# Patient Record
Sex: FEMALE | Race: WHITE | NOT HISPANIC OR LATINO | Employment: FULL TIME | URBAN - METROPOLITAN AREA
[De-identification: names, ages, dates, MRNs, and addresses within clinical notes are randomized per-mention and may not be internally consistent; named-entity substitution may affect disease eponyms.]

---

## 2017-01-02 DIAGNOSIS — M35.00 SJOGREN'S SYNDROME: ICD-10-CM

## 2017-01-02 RX ORDER — CEVIMELINE HYDROCHLORIDE 30 MG/1
CAPSULE ORAL
Qty: 180 CAPSULE | Refills: 0 | Status: SHIPPED | OUTPATIENT
Start: 2017-01-02 | End: 2017-09-23 | Stop reason: SDUPTHER

## 2017-01-26 DIAGNOSIS — M35.00 SJOGREN'S SYNDROME: ICD-10-CM

## 2017-01-26 RX ORDER — HYDROXYCHLOROQUINE SULFATE 200 MG/1
TABLET, FILM COATED ORAL
Qty: 180 TABLET | Refills: 0 | Status: SHIPPED | OUTPATIENT
Start: 2017-01-26 | End: 2017-05-04 | Stop reason: SDUPTHER

## 2017-04-07 NOTE — TELEPHONE ENCOUNTER
----- Message from Layne Eldridge sent at 4/7/2017  1:47 PM CDT -----  Contact: pt  Pt is requesting an appt due to being an established patient with same doctor at [facility]. First time seeing him at [facility].     Pt can be reached at [phone].

## 2017-04-10 RX ORDER — AMLODIPINE BESYLATE 2.5 MG/1
2.5 TABLET ORAL DAILY
Qty: 90 TABLET | Refills: 1 | Status: SHIPPED | OUTPATIENT
Start: 2017-04-10 | End: 2017-05-29 | Stop reason: SDUPTHER

## 2017-04-28 RX ORDER — LOSARTAN POTASSIUM 25 MG/1
25 TABLET ORAL NIGHTLY
Refills: 3 | COMMUNITY
Start: 2017-04-07 | End: 2017-05-29 | Stop reason: SDUPTHER

## 2017-04-28 RX ORDER — LANOLIN ALCOHOL/MO/W.PET/CERES
1 CREAM (GRAM) TOPICAL DAILY
COMMUNITY
End: 2022-07-28

## 2017-04-29 RX ORDER — ACETAMINOPHEN 160 MG/5ML
200 SUSPENSION, ORAL (FINAL DOSE FORM) ORAL DAILY
COMMUNITY

## 2017-05-04 DIAGNOSIS — M35.00 SJOGREN'S SYNDROME: ICD-10-CM

## 2017-05-04 RX ORDER — HYDROXYCHLOROQUINE SULFATE 200 MG/1
TABLET, FILM COATED ORAL
Qty: 180 TABLET | Refills: 0 | Status: SHIPPED | OUTPATIENT
Start: 2017-05-04 | End: 2017-07-30 | Stop reason: SDUPTHER

## 2017-05-28 PROBLEM — R80.9 PROTEINURIA: Status: ACTIVE | Noted: 2017-05-28

## 2017-05-28 PROBLEM — I10 ESSENTIAL HYPERTENSION: Status: ACTIVE | Noted: 2017-05-28

## 2017-05-28 PROBLEM — I08.0 MITRAL AND AORTIC REGURGITATION: Status: ACTIVE | Noted: 2017-05-28

## 2017-05-29 ENCOUNTER — OFFICE VISIT (OUTPATIENT)
Dept: CARDIOLOGY | Facility: CLINIC | Age: 52
End: 2017-05-29
Payer: COMMERCIAL

## 2017-05-29 VITALS
HEART RATE: 62 BPM | SYSTOLIC BLOOD PRESSURE: 131 MMHG | DIASTOLIC BLOOD PRESSURE: 75 MMHG | HEIGHT: 64 IN | BODY MASS INDEX: 26.08 KG/M2 | WEIGHT: 152.75 LBS

## 2017-05-29 DIAGNOSIS — I08.0 MITRAL AND AORTIC REGURGITATION: ICD-10-CM

## 2017-05-29 DIAGNOSIS — I73.00 RAYNAUD'S PHENOMENON WITHOUT GANGRENE: ICD-10-CM

## 2017-05-29 DIAGNOSIS — I10 ESSENTIAL HYPERTENSION: Primary | ICD-10-CM

## 2017-05-29 PROCEDURE — 99213 OFFICE O/P EST LOW 20 MIN: CPT | Mod: S$GLB,,, | Performed by: INTERNAL MEDICINE

## 2017-05-29 PROCEDURE — 99999 PR PBB SHADOW E&M-EST. PATIENT-LVL III: CPT | Mod: PBBFAC,,, | Performed by: INTERNAL MEDICINE

## 2017-05-29 RX ORDER — LOSARTAN POTASSIUM 25 MG/1
25 TABLET ORAL NIGHTLY
Qty: 90 TABLET | Refills: 1 | Status: SHIPPED | OUTPATIENT
Start: 2017-05-29 | End: 2017-07-11 | Stop reason: SDUPTHER

## 2017-05-29 RX ORDER — ESTRADIOL 10 UG/1
TABLET VAGINAL
Refills: 6 | COMMUNITY
Start: 2017-05-10 | End: 2018-09-14 | Stop reason: ALTCHOICE

## 2017-05-29 RX ORDER — AMLODIPINE BESYLATE 2.5 MG/1
2.5 TABLET ORAL DAILY
Qty: 90 TABLET | Refills: 1 | Status: SHIPPED | OUTPATIENT
Start: 2017-05-29 | End: 2018-01-15 | Stop reason: SDUPTHER

## 2017-05-29 NOTE — PROGRESS NOTES
Subjective:    Patient ID:  Shanta Sterling is a 51 y.o. female who presents for Hypertension and Valvular Heart Disease      HPI  [facility] RECORDS REVIEWED, NO LABS, DOING OK, BP LOG OK, RAYNAUD'S MUCH BETTER WITH CCB, SEE ROS  Past Medical History:   Diagnosis Date    Acid reflux     Acute gastritis     Dry eyes     Dry mouth     Heart murmur     Hypertension     Pneumonia 2000    [facility]    Sicca syndrome, unspecified     Sjogren's disease     Valvular regurgitation      Past Surgical History:   Procedure Laterality Date    APPENDECTOMY  11/2009         BREAST SURGERY Bilateral 04/2004    Augmentation    FOOT NEUROMA SURGERY Left 1992    HYSTERECTOMY  2002     Family History   Problem Relation Age of Onset    Hypertension Mother     COPD Father     Diabetes Mellitus Father     Heart disease Father     Hypertension Father      Social History     Social History    Marital status:      Spouse name: N/A    Number of children: N/A    Years of education: N/A     Social History Main Topics    Smoking status: Never Smoker    Smokeless tobacco: Never Used    Alcohol use Yes      Comment: one drink every deb 6 days/week    Drug use: No    Sexual activity: Not Asked     Other Topics Concern    None     Social History Narrative    None       Review of patient's allergies indicates:   Allergen Reactions    Codeine Other (See Comments)     vomiting       Current Outpatient Prescriptions:     amlodipine (NORVASC) 2.5 MG tablet, Take 1 tablet (2.5 mg total) by mouth once daily. (Patient taking differently: Take 1.25 mg by mouth once daily. ), Disp: 90 tablet, Rfl: 1    calcium citrate-vitamin D3 315-200 mg (CITRACAL+D) 315-200 mg-unit per tablet, Take 1 tablet by mouth once daily., Disp: , Rfl:     carboxymethylcellulose sodium (THERA TEARS) 0.25 % Drop, 1 drop as needed., Disp: , Rfl:     cevimeline (EVOXAC) 30 mg capsule, TAKE ONE CAPSULE BY MOUTH TWICE DAILY, Disp: 180  capsule, Rfl: 0    coenzyme Q10 (CO Q-10) 200 mg capsule, Take 200 mg by mouth once daily., Disp: , Rfl:     hydroxychloroquine (PLAQUENIL) 200 mg tablet, TAKE ONE TABLET BY MOUTH TWICE DAILY, Disp: 180 tablet, Rfl: 0    losartan (COZAAR) 25 MG tablet, Take 25 mg by mouth every evening., Disp: , Rfl: 3    multivitamin (MULTIVITAMIN) per tablet, Take 1 tablet by mouth once daily., Disp: , Rfl:     PROAIR RESPICLICK 90 mcg/actuation AePB, INHALE 2 PUFFS PO 30 MINUTES BEFORE EXERCISE, Disp: , Rfl: 5    ranitidine (ZANTAC) 150 MG tablet, Take 150 mg by mouth nightly., Disp: , Rfl:     YUVAFEM 10 mcg Tab, I ONE T VAG  HS 2 TIMES PER WEEK UTD, Disp: , Rfl: 6    fish oil-omega-3 fatty acids 300-1,000 mg capsule, Take 1 capsule by mouth 2 (two) times daily. , Disp: , Rfl:     Review of Systems   Constitution: Negative for chills, diaphoresis, fever, weakness, malaise/fatigue and night sweats.   HENT: Negative for congestion, hearing loss and nosebleeds.    Eyes: Negative for blurred vision, discharge, double vision and visual disturbance.   Cardiovascular: Negative for claudication, cyanosis, dyspnea on exertion, irregular heartbeat, leg swelling, near-syncope, orthopnea, palpitations, paroxysmal nocturnal dyspnea and syncope. Chest pain: OCC , WITH A LOT OF EXERTION.   Respiratory: Negative for cough, hemoptysis, shortness of breath, sleep disturbances due to breathing, sputum production and wheezing.    Endocrine: Negative for cold intolerance, heat intolerance and polyuria.   Hematologic/Lymphatic: Negative for adenopathy. Does not bruise/bleed easily.   Skin: Negative for color change, itching and nail changes.   Musculoskeletal: Negative for back pain, falls, joint pain and stiffness.   Gastrointestinal: Negative for bloating, abdominal pain, change in bowel habit, constipation, dysphagia, flatus, heartburn, hematemesis, jaundice, melena and vomiting.   Genitourinary: Negative for dysuria, flank pain, frequency  "and incomplete emptying.   Neurological: Negative for brief paralysis, difficulty with concentration, disturbances in coordination, dizziness, focal weakness, light-headedness, loss of balance, numbness, paresthesias, seizures, sensory change and tremors.   Psychiatric/Behavioral: Negative for altered mental status, depression, memory loss and substance abuse. The patient is not nervous/anxious.    Allergic/Immunologic: Negative for persistent infections.        Objective:      Vitals:    05/29/17 1527   BP: 131/75   Pulse: 62   Weight: 69.3 kg (152 lb 12.5 oz)   Height: 5' 4" (1.626 m)   PainSc: 0-No pain     Body mass index is 26.22 kg/m².    Physical Exam   Constitutional: She is oriented to person, place, and time. She appears well-developed and well-nourished. She is active.   HENT:   Head: Normocephalic and atraumatic.   Mouth/Throat: Oropharynx is clear and moist and mucous membranes are normal.   Eyes: Conjunctivae and EOM are normal. Pupils are equal, round, and reactive to light.   Neck: Trachea normal and normal range of motion. Neck supple. Normal carotid pulses, no hepatojugular reflux and no JVD present. Carotid bruit is not present. No tracheal deviation, no edema and no erythema present. No thyromegaly present.   Cardiovascular: Normal rate and regular rhythm.   No extrasystoles are present. PMI is not displaced.  Exam reveals no gallop and no friction rub.    Murmur heard.  High-pitched blowing holosystolic murmur is present  at the apex  High-pitched blowing decrescendo early diastolic murmur is present  at the upper right sternal border radiating to the apex  Pulses:       Carotid pulses are 2+ on the right side, and 2+ on the left side.       Radial pulses are 2+ on the right side, and 2+ on the left side.        Femoral pulses are 2+ on the right side, and 2+ on the left side.       Popliteal pulses are 2+ on the right side, and 2+ on the left side.        Dorsalis pedis pulses are 2+ on the " right side, and 2+ on the left side.        Posterior tibial pulses are 2+ on the right side, and 2+ on the left side.   Pulmonary/Chest: Effort normal and breath sounds normal. No tachypnea and no bradypnea. She has no wheezes. She has no rales. She exhibits no tenderness.   Abdominal: Soft. Bowel sounds are normal. She exhibits no distension and no mass. There is no hepatosplenomegaly. There is no tenderness. There is no guarding and no CVA tenderness.   Musculoskeletal: Normal range of motion. She exhibits no edema or tenderness.   Lymphadenopathy:     She has no cervical adenopathy.   Neurological: She is alert and oriented to person, place, and time. She has normal strength. She displays no tremor. No cranial nerve deficit. She exhibits normal muscle tone. Coordination normal.   Skin: Skin is warm and dry. No rash noted. No cyanosis or erythema. No pallor.   Psychiatric: She has a normal mood and affect. Her speech is normal and behavior is normal. Judgment and thought content normal.               ..    Chemistry        Component Value Date/Time     10/27/2015 0921    K 4.1 10/27/2015 0921     10/27/2015 0921    CO2 26 10/27/2015 0921    BUN 14 10/27/2015 0921    CREATININE 1.0 10/27/2015 0921    GLU 88 10/27/2015 0921        Component Value Date/Time    CALCIUM 8.8 10/27/2015 0921    ALKPHOS 55 10/27/2015 0921    AST 23 10/27/2015 0921    ALT 19 10/27/2015 0921    BILITOT 0.7 10/27/2015 0921            ..No results found for: CHOL  No results found for: HDL  No results found for: LDLCALC  No results found for: TRIG  No results found for: CHOLHDL  ..  Lab Results   Component Value Date    WBC 5.97 10/27/2015    HGB 12.9 10/27/2015    HCT 38.4 10/27/2015    MCV 90 10/27/2015     10/27/2015       Test(s) Reviewed  I have reviewed the following in detail:  [] Stress test   [] Angiography   [] Echocardiogram   [] Labs   [x] Other:       Assessment:         ICD-10-CM ICD-9-CM   1. Essential  hypertension I10 401.9   2. Mitral and aortic regurgitation I08.0 396.3   3. Raynaud's phenomenon without gangrene I73.00 443.0     Problem List Items Addressed This Visit        Cardiology Problems    Essential hypertension - Primary    Relevant Orders    Basic metabolic panel    Mitral and aortic regurgitation       Other    Raynaud's phenomenon      Other Visit Diagnoses    None.          Plan:         ALL CV CLINICALLY STABLE, NO ANGINA, NO HF, NO TIA, NO CLINICAL ARRHYTHMIA,CONTINUE CURRENT MEDS, EDUCATION, DIET, EXERCISE, CHECK BMP OK,,.RTC IN 6 MO WITH BP LOG  Essential hypertension  -     Basic metabolic panel; Future; Expected date: 05/29/2017    Mitral and aortic regurgitation    Raynaud's phenomenon without gangrene    RTC Low level/low impact aerobic exercise 5x's/wk. Heart healthy diet and risk factor modification.    See labs and med orders.    Aerobic exercise 5x's/wk. Heart healthy diet and risk factor modification.    See labs and med orders.

## 2017-05-29 NOTE — PATIENT INSTRUCTIONS
Exercise for a Healthier Heart  You may wonder how you can improve the health of your heart. If youre thinking about exercise, youre on the right track. You dont need to become an athlete, but you do need a certain amount of brisk exercise to help strengthen your heart. If you have been diagnosed with a heart condition, your doctor may recommend exercise to help stabilize your condition. To help make exercise a habit, choose safe, fun activities.     Exercise with a friend. When activity is fun, you're more likely to stick with it.     Be sure to check with your healthcare provider before starting an exercise program.   Why exercise?  Exercising regularly offers many healthy rewards. It can help you do all of the following:  · Improve your blood cholesterol level to help prevent further heart trouble  · Lower your blood pressure to help prevent a stroke or heart attack  · Control diabetes, or reduce your risk of getting this disease  · Improve your heart and lung function  · Reach and maintain a healthy weight  · Make your muscles stronger and more limber so you can stay active  · Prevent falls and fractures by slowing the loss of bone mass (osteoporosis)  · Manage stress better  · Reduce your blood pressure  · Improve your sense of self and your body image  Exercise tips  Ease into your routine. Set small goals. Then build on them.  Exercise on most days. Aim for a total of 150 or more minutes of moderate to  vigorous intensity activity each week. Consider 40 minutes, 3 to 4 times a week. For best results, activity should last for 40 minutes on average. It is OK to work up to the 40 minute period over time. Examples of moderate-intensity activity is walking 1 mile in 15 minutes or 30 to 45 minutes of yard work.  Step up your daily activity level. Along with your exercise program, try being more active throughout the day. Walk instead of drive. Do more household tasks or yard work.  Choose one or more  activities you enjoy. Walking is one of the easiest things you can do. You can also try swimming, riding a bike, dancing, or taking an exercise class.  Stop exercising and call your doctor if you:  · Have chest pain or feel dizzy or lightheaded  · Feel burning, tightness, pressure, or heaviness in your chest, neck, shoulders, back, or arms  · Have unusual shortness of breath  · Have increased joint or muscle pain  · Have palpitations or an irregular heartbeat   Date Last Reviewed: 5/1/2016 © 2000-2016 [employer]. [address] [place] [id number]. All rights reserved. This information is not intended as a substitute for professional medical care. Always follow your healthcare professional's instructions.        Controlling High Blood Pressure  High blood pressure (hypertension) is often called the silent killer. This is because many people who have it dont know it. High blood pressure is defined as 140/90 mm Hg or higher. Know your blood pressure and remember to check it regularly. Doing so can save your life. Here are some things you can do to help control your blood pressure.    Choose heart-healthy foods  · Select low-salt, low-fat foods. Limit sodium intake to 2,400 mg per day or the amount suggested by your healthcare provider.  · Limit canned, dried, cured, packaged, and fast foods. These can contain a lot of salt.  · Eat 8 to 10 servings of fruits and vegetables every day.  · Choose lean meats, fish, or chicken.  · Eat whole-grain pasta, brown rice, and beans.  · Eat 2 to 3 servings of low-fat or fat-free dairy products.  · Ask your doctor about the DASH eating plan. This plan helps reduce blood pressure.  · When you go to a restaurant, ask that your meal be prepared with no added salt.  Maintain a healthy weight  · Ask your healthcare provider how many calories to eat a day. Then stick to that number.  · Ask your healthcare provider what weight range is healthiest for you. If you are  overweight, a weight loss of only 3% to 5% of your body weight can help lower blood pressure. Generally, a good weight loss goal is to lose 10% of your body weight in a year.  · Limit snacks and sweets.  · Get regular exercise.  Get up and get active  · Choose activities you enjoy. Find ones you can do with friends or family. This includes bicycling, dancing, walking, and jogging.  · Park farther away from building entrances.  · Use stairs instead of the elevator.  · When you can, walk or bike instead of driving.  · Campbell leaves, garden, or do household repairs.  · Be active at a moderate to vigorous level of physical activity for at least 40 minutes for a minimum of 3 to 4 days a week.   Manage stress  · Make time to relax and enjoy life. Find time to laugh.  · Communicate your concerns with your loved ones and your healthcare provider.  · Visit with family and friends, and keep up with hobbies.  Limit alcohol and quit smoking  · Men should have no more than 2 drinks per day.  · Women should have no more than 1 drink per day.  · Talk with your healthcare provider about quitting smoking. Smoking significantly increases your risk for heart disease and stroke. Ask your healthcare provider about community smoking cessation programs and other options.  Medicines  If lifestyle changes arent enough, your healthcare provider may prescribe high blood pressure medicine. Take all medicines as prescribed. If you have any questions about your medicines, ask your healthcare provider before stopping or changing them.   Date Last Reviewed: 4/27/2016  © 5968-5517 [employer]. [address] [place] [address], [place] [address]. All rights reserved. This information is not intended as a substitute for professional medical care. Always follow your healthcare professional's instructions.        Heart Valve Problems  Your hearts job is to pump blood through your body. That job starts with pumping blood through the heart itself.  Inside your heart, blood passes through a series of one-way lam called valves. If a valve works poorly, not enough blood moves forward. A problem heart valve may not open wide enough, not close tightly enough, or both. In any case, not enough blood is sent to the heart muscle or out to the body.  Symptoms of heart valve problems  You can have a problem valve for decades yet have no symptoms. If you do have symptoms, they may come on so slowly that you barely notice them. In other cases, though, symptoms appear suddenly. You might have one or more of these symptoms:  · Problems breathing when you lie down, exert yourself, or get stressed emotionally  · Pain, pressure, tightness, or numbness in your chest, neck, back, or arms (angina)  · Feeling dizzy, faint, or lightheaded  · Tiredness, especially with activity or as the day goes on  · Waking up at night coughing or short of breath  · A fast, pounding, or irregular heartbeat  · A fluttering feeling in your chest  · Swollen ankles or feet  · Fainting, especially upon standing up or with exertion  Problems opening (stenosis)    When a valve doesnt open all the way, the problem is called stenosis. The leaflets may be stuck together or too stiff to open fully. When the valve doesnt open fully, blood has to flow through a smaller opening. So the heart muscle has to work harder to push the blood through the valve.  Problems closing (regurgitation)    When a valve doesnt close tightly enough and blood leaks backward through the valve, the problem is called regurgitation or insufficiency. The valve itself may be described as leaky. Leaflets may fit together poorly. Or the structures that support them may be torn. Some blood leaks through the valve back into the chamber it just left. So the heart has to move that blood twice. This can result in heart muscle damage.  Common causes of valve problems  People of any age can have heart valve trouble. You may have been born  with a problem valve. Or a valve may have worn out as youve aged. It may not be possible to pinpoint what caused your valve problem. But common causes include:  · Buildup of calcium or scar tissue on a valve  · Rheumatic fever and certain other infections and diseases  · High blood pressure  · Other heart problems, such as coronary artery disease  · Congenital defects of the heart valves      Date Last Reviewed: 6/1/2016  © 2085-2305 [employer]. [address] [place] [address], [place] [address]. All rights reserved. This information is not intended as a substitute for professional medical care. Always follow your healthcare professional's instructions.

## 2017-07-06 ENCOUNTER — OFFICE VISIT (OUTPATIENT)
Dept: RHEUMATOLOGY | Facility: CLINIC | Age: 52
End: 2017-07-06
Payer: COMMERCIAL

## 2017-07-06 VITALS
SYSTOLIC BLOOD PRESSURE: 128 MMHG | DIASTOLIC BLOOD PRESSURE: 90 MMHG | HEART RATE: 57 BPM | HEIGHT: 64 IN | WEIGHT: 158 LBS | BODY MASS INDEX: 26.98 KG/M2

## 2017-07-06 DIAGNOSIS — I73.00 RAYNAUD'S PHENOMENON WITHOUT GANGRENE: ICD-10-CM

## 2017-07-06 DIAGNOSIS — M35.00 SJOGREN'S SYNDROME, WITH UNSPECIFIED ORGAN INVOLVEMENT: Primary | ICD-10-CM

## 2017-07-06 DIAGNOSIS — Z79.899 LONG-TERM USE OF HYDROXYCHLOROQUINE: ICD-10-CM

## 2017-07-06 DIAGNOSIS — I10 ESSENTIAL HYPERTENSION: ICD-10-CM

## 2017-07-06 PROCEDURE — 99214 OFFICE O/P EST MOD 30 MIN: CPT | Mod: S$GLB,,, | Performed by: INTERNAL MEDICINE

## 2017-07-06 PROCEDURE — 99999 PR PBB SHADOW E&M-EST. PATIENT-LVL III: CPT | Mod: PBBFAC,,, | Performed by: INTERNAL MEDICINE

## 2017-07-06 NOTE — PROGRESS NOTES
"Subjective:       Patient ID: Shanta Sterling is a 51 y.o. female with Sjogren's syndrome.    Chief Complaint: Follow-up  She returns for follow up. 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      She is taking HCQ and evoxac and is medically doing well with theratears vitamins & eye drops. She could not tolerate restasis. Scheduled for eye exam. Sees the dentist 2 x/year. Teeth are in good repair.Raynaud's has been lessened by the addition of a small amlodipine by her cardiologist. She has no ischemic changes, no digital scars, loss of pulp etc. Has some days where ankles and knees are stiff on arising. Denies dysphagia, tight skin, alopecia, pleurisy, pericarditis, photosensitivity, skin rashes. Has dry lips as well as mouth and eyes. May have reactive airway disease. Still taking CoQ 100 mg which has helped myalgias (in past) considerably.  She is very active and runs 3 x/week.     She is followed by cardiology where routine labs are done and reported are normal although there is no record of them. Cardiology has also documented heart murmurs and dx MR and AR: " High-pitched blowing holosystolic murmur is present  at the apex  High-pitched blowing decrescendo early diastolic murmur is present  at the upper right sternal border radiating to the apex"               She reports no joint swelling. Pertinent negatives include no dysuria, fatigue, fever, trouble swallowing, myalgias or headaches.         Current Outpatient Prescriptions   Medication Sig Dispense Refill    amlodipine (NORVASC) 2.5 MG tablet Take 1 tablet (2.5 mg total) by mouth once daily. 90 tablet 1    calcium citrate-vitamin D3 315-200 mg (CITRACAL+D) 315-200 mg-unit per tablet Take 1 tablet by mouth once daily.      carboxymethylcellulose sodium (THERA TEARS) 0.25 % Drop 1 drop as needed.      cevimeline (EVOXAC) 30 mg capsule TAKE ONE CAPSULE BY MOUTH TWICE DAILY 180 capsule 0 "    coenzyme Q10 (CO Q-10) 200 mg capsule Take 200 mg by mouth once daily.      hydroxychloroquine (PLAQUENIL) 200 mg tablet TAKE ONE TABLET BY MOUTH TWICE DAILY 180 tablet 0    losartan (COZAAR) 25 MG tablet Take 1 tablet (25 mg total) by mouth every evening. 90 tablet 1    multivitamin (MULTIVITAMIN) per tablet Take 1 tablet by mouth once daily.      PROAIR RESPICLICK 90 mcg/actuation AePB INHALE 2 PUFFS PO 30 MINUTES BEFORE EXERCISE  5    ranitidine (ZANTAC) 150 MG tablet Take 150 mg by mouth nightly.      YUVAFEM 10 mcg Tab I ONE T VAG  HS 2 TIMES PER WEEK UTD  6     No current facility-administered medications for this visit.        Allergies   Allergen Reactions    Codeine Other (See Comments)     vomiting     Past Medical History:   Diagnosis Date    Acid reflux     Acute gastritis     Dry eyes     Dry mouth     Heart murmur     Hypertension     [facility]    Sicca syndrome, unspecified     Sjogren's disease     Valvular regurgitation      Past Surgical History:   Procedure Laterality Date    APPENDECTOMY  11/2009         BREAST SURGERY Bilateral 04/2004    Augmentation    FOOT NEUROMA SURGERY Left 1992    HYSTERECTOMY  2002         Review of Systems   Constitutional: Positive for diaphoresis. Negative for fatigue, fever and unexpected weight change.   HENT: Negative.  Negative for mouth sores, sore throat, tinnitus and trouble swallowing.         Dry mouth   Eyes: Positive for redness. Negative for visual disturbance.        Dry eyes   Respiratory: Negative.  Negative for cough, choking, chest tightness and shortness of breath.    Cardiovascular: Negative.  Negative for chest pain, palpitations and leg swelling.   Gastrointestinal: Negative.  Negative for abdominal distention, abdominal pain, blood in stool, constipation, diarrhea, nausea and vomiting.        Heartburn   Endocrine: Negative.    Genitourinary: Negative.  Negative for dysuria, frequency, genital  "sores, hematuria and menstrual problem.   Musculoskeletal: Negative.  Negative for back pain, joint swelling, myalgias, neck pain and neck stiffness.   Skin: Negative.  Negative for rash.   Allergic/Immunologic: Negative.    Neurological: Positive for numbness. Negative for dizziness, syncope, weakness, light-headedness and headaches.   Hematological: Negative.  Negative for adenopathy. Does not bruise/bleed easily.   Psychiatric/Behavioral: Negative.  Negative for dysphoric mood and sleep disturbance. The patient is not nervous/anxious.          Family History   Problem Relation Age of Onset    Hypertension Mother     COPD Father     Diabetes Mellitus Father     Heart disease Father     Hypertension Father        SH: non smoker; social drinker; [employer] [employer] where she worked closed and she & others had to look for jobs. She is currently at [employer] [facility] [employer]. She lives on the [facility] and this is inconvenient and she has to do night shifts;    Active at work; runs 3 x/week    Objective:     BP (!) 128/90 (BP Location: Left arm, Patient Position: Sitting, BP Method: Automatic)   Pulse (!) 57   Ht 5' 4" (1.626 m)   Wt 71.7 kg (158 lb)   BMI 27.12 kg/m² Accompanied by .   Physical Exam   Vitals reviewed.  Constitutional: She is oriented to person, place, and time and well-developed, well-nourished, and in no distress. No distress.   HENT:   Head: Normocephalic and atraumatic.   Mouth/Throat: Oropharynx is clear and moist. No oropharyngeal exudate.   No facial rashes  Parotids not enlarged  No oral ulcers  Adequate moisture  Teeth in excellent repair.   Eyes: Conjunctivae and EOM are normal. Pupils are equal, round, and reactive to light. Right eye exhibits no discharge. Left eye exhibits no discharge. No scleral icterus.   Neck: Neck supple. No JVD present. No tracheal deviation present. No thyromegaly present.   Cardiovascular: Normal rate, regular rhythm, normal heart sounds and intact distal " "pulses.  Exam reveals no gallop and no friction rub.    No murmur heard.  Murmurs reported by cardiologist: "High-pitched blowing holosystolic murmur is present  at the apex  High-pitched blowing decrescendo early diastolic murmur is present  at the upper right sternal border radiating to the apex"   Pulmonary/Chest: Effort normal and breath sounds normal. No respiratory distress. She has no wheezes. She has no rales. She exhibits no tenderness.   Abdominal: Soft. Bowel sounds are normal. She exhibits no distension and no mass. There is no splenomegaly or hepatomegaly. There is no tenderness. There is no rebound and no guarding.   Lymphadenopathy:     She has no cervical adenopathy.        Right: No inguinal adenopathy present.        Left: No inguinal adenopathy present.   Neurological: She is alert and oriented to person, place, and time. She has normal reflexes. No cranial nerve deficit. Gait normal.   Proximal and distal muscle strength 5/5.   Skin: Skin is warm and dry. No rash noted. She is not diaphoretic.     Psychiatric: Mood, memory, affect and judgment normal.   Musculoskeletal: Normal range of motion. She exhibits no edema or tenderness.   Cspine FROM no tenderness  Tspine FROM no tenderness  Lspine FROM no tenderness.  TMJ: unremarkable  Shoulders: FROM; no synovitis;  Elbows: FROM; no synovitis; no tophi or nodules  Wrists: FROM; no synovitis;   MCPs: Minimal squaring of 1st C_MCP joints bilaterally. FROM; no synovitis; no metacarpalgia;  ok;  PIPs:FROM; no synovitis;   DIPs: FROM; no synovitis;   HIPS: FROM  Knees: FROM; no synovitis; no instability;  Ankles: FROM: no synovitis   Toes: ok; no metatarsalgia.         LABS:   10/27/15: ESR 3: CRP 2.2; CBC, CMP ok;   14: ESR 2; CRP 0.7; CBC ok; alb. 3.4;   13: ESR 10; CRP 0.7; CBC ok cnne 1.1; GFR 53;   MARIA DOLORES 1:160sp; +SSA; +SSB; +RF (53);   Neg or wnl: CCP, C3, C4, Scl-70; UA; LAC; aCLA; [id number]  Vit D 56   Imagin13: Arthritis " survey personally reviewed: unremarkable     Assessment:   Sjogren's syndrome since 7/11 on HCQ since 7/11--doing very well.   On  mg/d & evoxac 30 mg bid   +MARIA DOLORES, +SSA, +SSB, +RF    with dry eyes (intolerant to restasis),    dry mouth (on evoxac, intolerant to pilocarpine)    associated with polyarthritis (pain & swelling: hands, elbows, shoulders, knees & feet)    Occasional oral ulcers & upper dysphagia.    Raynaud's  & AM stiffness     RP helped by amlodipine 2.5 mg/d   mother with UCTD     Hypertension      Menopausal   S/P hysterectomy wo BSO   + vasomotor sxs-night sweats    Mitral and Aortic Insufficiency as per cardiology      Renal insufficiency precipitated by Aleve in past.    Normal weight.      Plan:   Continue  mg/day + evoxac 30 mg tid.  Keep up with eye appointments.   Keep up with exercise.  Fax any labs.   RTC 6 months or prn.  Answers for HPI/ROS submitted by the patient on 7/6/2017   swollen glands: No  heartburn: No  tingling: Yes

## 2017-07-11 RX ORDER — LOSARTAN POTASSIUM 25 MG/1
25 TABLET ORAL NIGHTLY
Qty: 30 TABLET | Refills: 5 | Status: SHIPPED | OUTPATIENT
Start: 2017-07-11 | End: 2017-12-19 | Stop reason: SDUPTHER

## 2017-07-30 DIAGNOSIS — M35.00 SJOGREN'S SYNDROME: ICD-10-CM

## 2017-07-30 RX ORDER — HYDROXYCHLOROQUINE SULFATE 200 MG/1
TABLET, FILM COATED ORAL
Qty: 180 TABLET | Refills: 0 | Status: SHIPPED | OUTPATIENT
Start: 2017-07-30 | End: 2017-11-01 | Stop reason: SDUPTHER

## 2017-09-23 DIAGNOSIS — M35.00 SJOGREN'S SYNDROME: ICD-10-CM

## 2017-09-24 RX ORDER — CEVIMELINE HYDROCHLORIDE 30 MG/1
CAPSULE ORAL
Qty: 180 CAPSULE | Refills: 2 | Status: SHIPPED | OUTPATIENT
Start: 2017-09-24 | End: 2018-09-15 | Stop reason: SDUPTHER

## 2017-10-02 RX ORDER — AMLODIPINE BESYLATE 2.5 MG/1
TABLET ORAL
Qty: 90 TABLET | Refills: 0 | Status: SHIPPED | OUTPATIENT
Start: 2017-10-02 | End: 2018-01-15

## 2017-10-02 RX ORDER — AMLODIPINE BESYLATE 2.5 MG/1
TABLET ORAL
Qty: 90 TABLET | Refills: 0 | Status: SHIPPED | OUTPATIENT
Start: 2017-10-02 | End: 2018-01-04 | Stop reason: SDUPTHER

## 2017-11-01 DIAGNOSIS — M35.00 SJOGREN'S SYNDROME: ICD-10-CM

## 2017-11-01 RX ORDER — HYDROXYCHLOROQUINE SULFATE 200 MG/1
TABLET, FILM COATED ORAL
Qty: 180 TABLET | Refills: 2 | Status: SHIPPED | OUTPATIENT
Start: 2017-11-01 | End: 2018-07-23 | Stop reason: SDUPTHER

## 2017-12-19 RX ORDER — LOSARTAN POTASSIUM 25 MG/1
TABLET ORAL
Qty: 30 TABLET | Refills: 0 | Status: SHIPPED | OUTPATIENT
Start: 2017-12-19 | End: 2018-01-15 | Stop reason: SDUPTHER

## 2018-01-05 RX ORDER — AMLODIPINE BESYLATE 2.5 MG/1
TABLET ORAL
Qty: 90 TABLET | Refills: 0 | Status: SHIPPED | OUTPATIENT
Start: 2018-01-05 | End: 2018-01-15

## 2018-01-08 NOTE — TELEPHONE ENCOUNTER
----- Message from Nilsa Morrow sent at 1/8/2018 10:09 AM CST -----  Contact: self   Patient is needing her labs put in the system so she can schedule to get them before her appt   Please call back [phone]

## 2018-01-11 ENCOUNTER — LAB VISIT (OUTPATIENT)
Dept: LAB | Facility: HOSPITAL | Age: 53
End: 2018-01-11
Attending: INTERNAL MEDICINE
Payer: COMMERCIAL

## 2018-01-11 DIAGNOSIS — I10 ESSENTIAL HYPERTENSION: ICD-10-CM

## 2018-01-11 DIAGNOSIS — I10 ESSENTIAL HYPERTENSION: Primary | ICD-10-CM

## 2018-01-11 LAB
ANION GAP SERPL CALC-SCNC: 9 MMOL/L
BUN SERPL-MCNC: 21 MG/DL
CALCIUM SERPL-MCNC: 9.9 MG/DL
CHLORIDE SERPL-SCNC: 106 MMOL/L
CO2 SERPL-SCNC: 23 MMOL/L
CREAT SERPL-MCNC: 1.1 MG/DL
EST. GFR  (AFRICAN AMERICAN): >60 ML/MIN/1.73 M^2
EST. GFR  (NON AFRICAN AMERICAN): 57.5 ML/MIN/1.73 M^2
GLUCOSE SERPL-MCNC: 87 MG/DL
POTASSIUM SERPL-SCNC: 4.8 MMOL/L
SODIUM SERPL-SCNC: 138 MMOL/L

## 2018-01-11 PROCEDURE — 36415 COLL VENOUS BLD VENIPUNCTURE: CPT | Mod: PO

## 2018-01-11 PROCEDURE — 80048 BASIC METABOLIC PNL TOTAL CA: CPT

## 2018-01-12 ENCOUNTER — LAB VISIT (OUTPATIENT)
Dept: LAB | Facility: HOSPITAL | Age: 53
End: 2018-01-12
Attending: OBSTETRICS & GYNECOLOGY
Payer: COMMERCIAL

## 2018-01-12 DIAGNOSIS — E66.8 OBESITY OF ENDOCRINE ORIGIN: ICD-10-CM

## 2018-01-12 DIAGNOSIS — F43.9 STATE OF STRESS: ICD-10-CM

## 2018-01-12 DIAGNOSIS — N95.1 SYMPTOMATIC MENOPAUSAL OR FEMALE CLIMACTERIC STATES: Primary | ICD-10-CM

## 2018-01-12 DIAGNOSIS — N94.10 PAIN IN FEMALE GENITALIA ON INTERCOURSE: ICD-10-CM

## 2018-01-12 LAB
ESTRADIOL SERPL-MCNC: 135 PG/ML
FSH SERPL-ACNC: 3.9 MIU/ML
T4 FREE SERPL-MCNC: 0.96 NG/DL
TSH SERPL DL<=0.005 MIU/L-ACNC: 0.59 UIU/ML

## 2018-01-12 PROCEDURE — 82670 ASSAY OF TOTAL ESTRADIOL: CPT

## 2018-01-12 PROCEDURE — 84443 ASSAY THYROID STIM HORMONE: CPT

## 2018-01-12 PROCEDURE — 36415 COLL VENOUS BLD VENIPUNCTURE: CPT | Mod: PO

## 2018-01-12 PROCEDURE — 83001 ASSAY OF GONADOTROPIN (FSH): CPT

## 2018-01-12 PROCEDURE — 84439 ASSAY OF FREE THYROXINE: CPT

## 2018-01-15 ENCOUNTER — OFFICE VISIT (OUTPATIENT)
Dept: CARDIOLOGY | Facility: CLINIC | Age: 53
End: 2018-01-15
Payer: COMMERCIAL

## 2018-01-15 VITALS
BODY MASS INDEX: 26.69 KG/M2 | DIASTOLIC BLOOD PRESSURE: 73 MMHG | WEIGHT: 156.31 LBS | HEIGHT: 64 IN | SYSTOLIC BLOOD PRESSURE: 115 MMHG | HEART RATE: 67 BPM

## 2018-01-15 DIAGNOSIS — I10 ESSENTIAL HYPERTENSION: Primary | ICD-10-CM

## 2018-01-15 DIAGNOSIS — I73.00 RAYNAUD'S PHENOMENON WITHOUT GANGRENE: ICD-10-CM

## 2018-01-15 DIAGNOSIS — I08.0 MITRAL AND AORTIC REGURGITATION: ICD-10-CM

## 2018-01-15 PROCEDURE — 99214 OFFICE O/P EST MOD 30 MIN: CPT | Mod: S$GLB,,, | Performed by: INTERNAL MEDICINE

## 2018-01-15 PROCEDURE — 99999 PR PBB SHADOW E&M-EST. PATIENT-LVL III: CPT | Mod: PBBFAC,,, | Performed by: INTERNAL MEDICINE

## 2018-01-15 RX ORDER — AMLODIPINE BESYLATE 2.5 MG/1
2.5 TABLET ORAL DAILY
Qty: 90 TABLET | Refills: 2 | Status: SHIPPED | OUTPATIENT
Start: 2018-01-15 | End: 2018-09-14 | Stop reason: SDUPTHER

## 2018-01-15 RX ORDER — LOSARTAN POTASSIUM 25 MG/1
TABLET ORAL
Qty: 90 TABLET | Refills: 2 | Status: SHIPPED | OUTPATIENT
Start: 2018-01-15 | End: 2018-09-14 | Stop reason: SDUPTHER

## 2018-01-15 NOTE — PROGRESS NOTES
Subjective:    Patient ID:  Shanta Sterling is a 52 y.o. female who presents for Hypertension and Valvular Heart Disease        HPI  Discussed LABS AND GOALS, BMP OK, DOING WELL, SEE ROS    Past Medical History:   Diagnosis Date    Acid reflux     Acute gastritis     Dry eyes     Dry mouth     Heart murmur     Hypertension     [facility] 2000    [facility]    Sicca syndrome, unspecified     Sjogren's disease     Valvular regurgitation      Past Surgical History:   Procedure Laterality Date    APPENDECTOMY  11/2009         BREAST SURGERY Bilateral 04/2004    Augmentation    FOOT NEUROMA SURGERY Left 1992    HYSTERECTOMY  2002     Family History   Problem Relation Age of Onset    Hypertension Mother     COPD Father     Diabetes Mellitus Father     Heart disease Father     Hypertension Father      Social History     Social History    Marital status:      Spouse name: N/A    Number of children: N/A    Years of education: N/A     Social History Main Topics    Smoking status: Never Smoker    Smokeless tobacco: Never Used    Alcohol use Yes      Comment: one drink every deb 6 days/week    Drug use: No    Sexual activity: Not Asked     Other Topics Concern    None     Social History Narrative    None       Review of patient's allergies indicates:   Allergen Reactions    Codeine Other (See Comments)     vomiting       Current Outpatient Prescriptions:     amLODIPine (NORVASC) 2.5 MG tablet, Take 1 tablet (2.5 mg total) by mouth once daily., Disp: 90 tablet, Rfl: 2    calcium citrate-vitamin D3 315-200 mg (CITRACAL+D) 315-200 mg-unit per tablet, Take 1 tablet by mouth once daily., Disp: , Rfl:     carboxymethylcellulose sodium (THERA TEARS) 0.25 % Drop, 1 drop as needed., Disp: , Rfl:     cevimeline (EVOXAC) 30 mg capsule, TAKE 1 CAPSULE BY MOUTH TWICE DAILY, Disp: 180 capsule, Rfl: 2    coenzyme Q10 (CO Q-10) 200 mg capsule, Take 200 mg by mouth once daily., Disp: , Rfl:      hydroxychloroquine (PLAQUENIL) 200 mg tablet, TAKE 1 TABLET BY MOUTH TWICE DAILY, Disp: 180 tablet, Rfl: 2    losartan (COZAAR) 25 MG tablet, TAKE 1 TABLET(25 MG) BY MOUTH EVERY EVENING, Disp: 90 tablet, Rfl: 2    multivitamin (MULTIVITAMIN) per tablet, Take 1 tablet by mouth once daily., Disp: , Rfl:     PROAIR RESPICLICK 90 mcg/actuation AePB, INHALE 2 PUFFS PO 30 MINUTES BEFORE EXERCISE, Disp: , Rfl: 5    ranitidine (ZANTAC) 150 MG tablet, Take 150 mg by mouth nightly., Disp: , Rfl:     YUVAFEM 10 mcg Tab, I ONE T VAG  HS 2 TIMES PER WEEK UTD, Disp: , Rfl: 6    Review of Systems   Constitution: Negative for chills, decreased appetite, diaphoresis, fever, weakness, malaise/fatigue and night sweats.   HENT: Negative for congestion and nosebleeds.    Eyes: Negative for blurred vision, double vision and visual disturbance.   Cardiovascular: Negative for chest pain (ONLY WITH DEHYDARTION/RUNNING), claudication, cyanosis, dyspnea on exertion, irregular heartbeat, leg swelling, near-syncope, orthopnea, palpitations, paroxysmal nocturnal dyspnea and syncope.        MUCH BETTER RAYNAUD'S SX   Respiratory: Negative for cough, hemoptysis and wheezing.    Endocrine: Negative for cold intolerance, heat intolerance and polyuria.   Hematologic/Lymphatic: Negative for adenopathy and bleeding problem. Does not bruise/bleed easily.   Skin: Negative for color change, itching and nail changes.   Musculoskeletal: Negative for back pain, falls, joint pain and stiffness.   Gastrointestinal: Negative for abdominal pain, change in bowel habit, dysphagia, heartburn, hematemesis, jaundice, melena and vomiting.   Genitourinary: Negative for dysuria, flank pain and frequency.   Neurological: Negative for brief paralysis, difficulty with concentration, disturbances in coordination, dizziness, focal weakness, light-headedness, loss of balance, numbness and tremors.   Psychiatric/Behavioral: Negative for altered mental status,  "depression, memory loss and substance abuse. The patient is not nervous/anxious.    Allergic/Immunologic: Negative for persistent infections.        Objective:      Vitals:    01/15/18 1433   BP: 115/73   Pulse: 67   Weight: 70.9 kg (156 lb 4.9 oz)   Height: 5' 4" (1.626 m)   PainSc: 0-No pain     Body mass index is 26.83 kg/m².    Physical Exam   Constitutional: She is oriented to person, place, and time. She appears well-developed and well-nourished. She is active.   HENT:   Head: Normocephalic and atraumatic.   Mouth/Throat: Oropharynx is clear and moist and mucous membranes are normal.   Eyes: Conjunctivae and EOM are normal. Pupils are equal, round, and reactive to light.   Neck: Trachea normal and normal range of motion. Neck supple. Normal carotid pulses, no hepatojugular reflux and no JVD present. Carotid bruit is not present. No tracheal deviation, no edema and no erythema present. No thyromegaly present.   Cardiovascular: Normal rate and regular rhythm.   No extrasystoles are present. PMI is not displaced.  Exam reveals no gallop and no friction rub.    Murmur heard.  High-pitched blowing holosystolic murmur is present  at the apex  High-pitched blowing decrescendo early diastolic murmur is present  at the upper right sternal border radiating to the apex  Pulses:       Carotid pulses are 2+ on the right side, and 2+ on the left side.       Radial pulses are 2+ on the right side, and 2+ on the left side.        Femoral pulses are 2+ on the right side, and 2+ on the left side.       Popliteal pulses are 2+ on the right side, and 2+ on the left side.        Dorsalis pedis pulses are 2+ on the right side, and 2+ on the left side.        Posterior tibial pulses are 2+ on the right side, and 2+ on the left side.   CAPILLARY REFILL IN TOES, FINGERS OK   Pulmonary/Chest: Effort normal and breath sounds normal. No tachypnea and no bradypnea. She has no wheezes. She has no rales. She exhibits no tenderness. "   Abdominal: Soft. Bowel sounds are normal. She exhibits no distension and no mass. There is no hepatosplenomegaly. There is no tenderness. There is no guarding and no CVA tenderness.   Musculoskeletal: Normal range of motion. She exhibits no edema or tenderness.   Lymphadenopathy:     She has no cervical adenopathy.   Neurological: She is alert and oriented to person, place, and time. She has normal strength. She displays no tremor. No cranial nerve deficit. She exhibits normal muscle tone. Coordination normal.   Skin: Skin is warm and dry. No rash noted. No cyanosis or erythema. No pallor.   Psychiatric: She has a normal mood and affect. Her speech is normal and behavior is normal. Judgment and thought content normal.               ..    Chemistry        Component Value Date/Time     01/11/2018 1258    K 4.8 01/11/2018 1258     01/11/2018 1258    CO2 23 01/11/2018 1258    BUN 21 (H) 01/11/2018 1258    CREATININE 1.1 01/11/2018 1258    GLU 87 01/11/2018 1258        Component Value Date/Time    CALCIUM 9.9 01/11/2018 1258    ALKPHOS 55 10/27/2015 0921    AST 23 10/27/2015 0921    ALT 19 10/27/2015 0921    BILITOT 0.7 10/27/2015 0921    ESTGFRAFRICA >60.0 01/11/2018 1258    EGFRNONAA 57.5 (A) 01/11/2018 1258            ..No results found for: CHOL  No results found for: HDL  No results found for: LDLCALC  No results found for: TRIG  No results found for: CHOLHDL  ..  Lab Results   Component Value Date    WBC 5.97 10/27/2015    HGB 12.9 10/27/2015    HCT 38.4 10/27/2015    MCV 90 10/27/2015     10/27/2015       Test(s) Reviewed  I have reviewed the following in detail:  [] Stress test   [] Angiography   [] Echocardiogram   [] Labs   [] Other:       Assessment:         ICD-10-CM ICD-9-CM   1. Essential hypertension I10 401.9   2. Mitral and aortic regurgitation I08.0 396.3   3. Raynaud's phenomenon without gangrene I73.00 443.0     Problem List Items Addressed This Visit        Cardiac/Vascular     Raynaud's phenomenon    Essential hypertension - Primary    Relevant Orders    Basic metabolic panel    Mitral and aortic regurgitation           Plan:     ALL CV CLINICALLY STABLE, NO ANGINA, NO HF, NO TIA, NO CLINICAL ARRHYTHMIA,CONTINUE CURRENT MEDS, EDUCATION, DIET, EXERCISE, CONTINUE CURRENT MEDS      Essential hypertension  -     Basic metabolic panel; Future; Expected date: 01/15/2018    Mitral and aortic regurgitation    Raynaud's phenomenon without gangrene    Other orders  -     amLODIPine (NORVASC) 2.5 MG tablet; Take 1 tablet (2.5 mg total) by mouth once daily.  Dispense: 90 tablet; Refill: 2  -     losartan (COZAAR) 25 MG tablet; TAKE 1 TABLET(25 MG) BY MOUTH EVERY EVENING  Dispense: 90 tablet; Refill: 2    RTC Low level/low impact aerobic exercise 5x's/wk. Heart healthy diet and risk factor modification.    See labs and med orders.    Aerobic exercise 5x's/wk. Heart healthy diet and risk factor modification.    See labs and med orders.

## 2018-03-11 NOTE — PROGRESS NOTES
"Subjective:       Patient ID: Shanta Sterling is a 52 y.o. female with Sjogren's syndrome.    Chief Complaint: No chief complaint on file.  She returns for follow up. She was last seen on 7/6/17. 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, and now is at [employer] but now doing mostly daytime shifts & only occasionally night.     On chronic HCQ without any eye problems except one--had an eye episode with the appearance of a [place] with reflective prismal light around it but it resolved after 2 hrs and eye MD was not concerned.  From the HCQ standpoint she's ok, seeing eye MD twice a year.      Has dry mouth and eyes. She is doing well on evoxac. She could not tolerate restasis and is using OTC artificial tears. No problems with teeth. Sees her dentist 2 x/yr.     She is still having Raynaud's but it's better on amlodipine. She has no ischemic changes, no digital scars, loss of pulp etc. She is also taking losartan & is followed by Dr. Jefferson Rivers for her heart issues.  (Cardiology has documented heart murmurs and dx MR and AR: " High-pitched blowing holosystolic murmur is present  at the apex; High-pitched blowing decrescendo early diastolic murmur is present  at the upper right sternal border radiating to the apex" ).     Still having some stiffness in her ankles on arising in the AM but no joint swelling anywhere. However had L shoulder pain a while back and was seen by orthopedic surgeon and injected. Also underwent PT & has some exercises to do at home, which she has not done lately. AM stiffness x 20 secs.     Denies dysphagia, tight skin, alopecia, pleurisy, pericarditis, photosensitivity, skin rashes.  Has had some oral ulcers; none lately. May have reactive airway disease. Still taking CoQ 100 mg which has helped myalgias (in past) considerably.                      She reports no joint swelling. Pertinent negatives include no dysuria, fatigue, fever, trouble swallowing, myalgias or " headaches.         Current Outpatient Prescriptions   Medication Sig Dispense Refill    amLODIPine (NORVASC) 2.5 MG tablet Take 1 tablet (2.5 mg total) by mouth once daily. 90 tablet 2    calcium citrate-vitamin D3 315-200 mg (CITRACAL+D) 315-200 mg-unit per tablet Take 1 tablet by mouth once daily.      carboxymethylcellulose sodium (THERA TEARS) 0.25 % Drop 1 drop as needed.      cevimeline (EVOXAC) 30 mg capsule TAKE 1 CAPSULE BY MOUTH TWICE DAILY 180 capsule 2    coenzyme Q10 (CO Q-10) 200 mg capsule Take 200 mg by mouth once daily.      hydroxychloroquine (PLAQUENIL) 200 mg tablet TAKE 1 TABLET BY MOUTH TWICE DAILY 180 tablet 2    losartan (COZAAR) 25 MG tablet TAKE 1 TABLET(25 MG) BY MOUTH EVERY EVENING 90 tablet 2    multivitamin (MULTIVITAMIN) per tablet Take 1 tablet by mouth once daily.      PROAIR RESPICLICK 90 mcg/actuation AePB INHALE 2 PUFFS PO 30 MINUTES BEFORE EXERCISE  5    ranitidine (ZANTAC) 150 MG tablet Take 150 mg by mouth nightly.      YUVAFEM 10 mcg Tab I ONE T VAG  HS 2 TIMES PER WEEK UTD  6     No current facility-administered medications for this visit.          Allergies   Allergen Reactions    Codeine Other (See Comments)     vomiting     Past Medical History:   Diagnosis Date    Acid reflux     Acute gastritis     Dry eyes     Dry mouth     Heart murmur     Hypertension     [facility] 2000    [facility]    Sicca syndrome, unspecified     Sjogren's disease     Valvular regurgitation      Past Surgical History:   Procedure Laterality Date    APPENDECTOMY  11/2009         BREAST SURGERY Bilateral 04/2004    Augmentation    FOOT NEUROMA SURGERY Left 1992    HYSTERECTOMY  2002         Review of Systems   Constitutional: Positive for diaphoresis. Negative for fatigue, fever and unexpected weight change.   HENT: Positive for mouth sores. Negative for sore throat, tinnitus and trouble swallowing.         Dry mouth   Eyes: Positive for redness. Negative for  "visual disturbance.        Dry eyes   Respiratory: Negative.  Negative for cough, choking, chest tightness and shortness of breath.    Cardiovascular: Negative.  Negative for chest pain, palpitations and leg swelling.   Gastrointestinal: Positive for constipation. Negative for abdominal distention, abdominal pain, blood in stool, diarrhea, nausea and vomiting.        Heartburn   Endocrine: Negative.    Genitourinary: Negative.  Negative for dysuria, frequency, genital sores, hematuria and menstrual problem.   Musculoskeletal: Negative.  Negative for back pain, joint swelling, myalgias, neck pain and neck stiffness.   Skin: Negative.  Negative for rash.   Allergic/Immunologic: Negative.    Neurological: Positive for numbness. Negative for dizziness, syncope, weakness, light-headedness and headaches.   Hematological: Negative.  Negative for adenopathy. Does not bruise/bleed easily.   Psychiatric/Behavioral: Negative.  Negative for dysphoric mood and sleep disturbance. The patient is not nervous/anxious.          Family History   Problem Relation Age of Onset    Hypertension Mother     COPD Father     Diabetes Mellitus Father     Heart disease Father     Hypertension Father        SH: non smoker; social drinker; [employer] where she worked closed and she & others had to look for jobs. She is currently at [employer] [employer]. She lives on the [facility] and this is inconvenient and she has to do night shifts;    Active at work; runs 3 x/week    Objective:   /87   Pulse 66   Ht 5' 4" (1.626 m)   Wt 70.3 kg (155 lb)   BMI 26.61 kg/m²   (need to reassess height & BMI as patient it taller than 64")  Physical Exam   Vitals reviewed.  Constitutional: She is oriented to person, place, and time and well-developed, well-nourished, and in no distress. No distress.   HENT:   Head: Normocephalic and atraumatic.   Mouth/Throat: Oropharynx is clear and moist. No oropharyngeal exudate.   No facial " "rashes  Parotids not enlarged  No oral ulcers  Adequate moisture  Teeth in excellent repair.   Eyes: Conjunctivae and EOM are normal. Pupils are equal, round, and reactive to light. Right eye exhibits no discharge. Left eye exhibits no discharge. No scleral icterus.   Neck: Neck supple. No JVD present. No tracheal deviation present. No thyromegaly present.   Cardiovascular: Normal rate, regular rhythm, normal heart sounds and intact distal pulses.  Exam reveals no gallop and no friction rub.    No murmur heard.  Murmurs reported by cardiologist: "High-pitched blowing holosystolic murmur is present  at the apex  High-pitched blowing decrescendo early diastolic murmur is present  at the upper right sternal border radiating to the apex"   Pulmonary/Chest: Effort normal and breath sounds normal. No respiratory distress. She has no wheezes. She has no rales. She exhibits no tenderness.   Abdominal: Soft. Bowel sounds are normal. She exhibits no distension and no mass. There is no splenomegaly or hepatomegaly. There is no tenderness. There is no rebound and no guarding.   Lymphadenopathy:     She has no cervical adenopathy.        Right: No inguinal adenopathy present.        Left: No inguinal adenopathy present.   Neurological: She is alert and oriented to person, place, and time. She has normal reflexes. No cranial nerve deficit. Gait normal.   Proximal and distal muscle strength 5/5.   Skin: Skin is warm and dry. No rash noted. She is not diaphoretic.     Psychiatric: Mood, memory, affect and judgment normal.   Musculoskeletal: Normal range of motion. She exhibits no edema or tenderness.   Cspine FROM no tenderness  Tspine FROM no tenderness  Lspine FROM no tenderness.  TMJ: unremarkable  Shoulders:Left missing about 10 degrees of abduction otherwise ok; no synovitis; R ok;  Elbows: FROM; no synovitis; no tophi or nodules  Wrists: FROM; no synovitis;   MCPs: Minimal squaring of 1st C_MCP joints bilaterally. FROM; no " synovitis; no metacarpalgia;  ok;  PIPs:FROM; no synovitis;   DIPs: FROM; no synovitis;   HIPS: FROM  Knees: FROM; no synovitis; no instability;  Ankles: FROM: no synovitis   Toes: ok; no metatarsalgia.             LABS:   18: TFTs ok;   18: BMP cnne 1.1 GFR 57.5; BUN 21  10/27/15: ESR 3: CRP 2.2; CBC, CMP ok;   14: ESR 2; CRP 0.7; CBC ok; alb. 3.4;   13: ESR 10; CRP 0.7; CBC ok cnne 1.1; GFR 53;   MARIA DOLORES 1:160sp; +SSA; +SSB; +RF (53);   Neg or wnl: CCP, C3, C4, Scl-70; UA; LAC; aCLA; [id number]  Vit D 56   Imagin13: Arthritis survey personally reviewed: unremarkable     Assessment:   Sjogren's syndrome since  on HCQ since --doing very well.   On  mg/d & evoxac 30 mg bid   +MARIA DOLORES, +SSA, +SSB, +RF    with dry eyes (intolerant to restasis),    dry mouth (on evoxac, intolerant to pilocarpine)    associated with polyarthritis (pain & swelling: hands, elbows, shoulders,   knees & feet)    Occasional oral ulcers & upper dysphagia.    Raynaud's  & AM stiffness     RP helped by amlodipine 2.5 mg/d   mother with UCTD     L adhesive capsulitis with calcification head of humerus   Responsive to injection & some PT   Still w reduced ROM    Menopausal   S/P hysterectomy wo BSO   + vasomotor sxs-night sweats    Mitral and Aortic Insufficiency as per cardiology    Essential hypertension    Renal insufficiency precipitated by Aleve in past.          Plan:   Continue  mg/day + evoxac 30 mg tid.  Keep up with eye appointments.   Showed 3 exercises to do for shoulder.   Labs tomorrow   RTC 6 months or prn.

## 2018-03-12 ENCOUNTER — OFFICE VISIT (OUTPATIENT)
Dept: RHEUMATOLOGY | Facility: CLINIC | Age: 53
End: 2018-03-12
Payer: COMMERCIAL

## 2018-03-12 VITALS
WEIGHT: 155 LBS | HEART RATE: 66 BPM | SYSTOLIC BLOOD PRESSURE: 126 MMHG | BODY MASS INDEX: 24.91 KG/M2 | HEIGHT: 66 IN | DIASTOLIC BLOOD PRESSURE: 87 MMHG

## 2018-03-12 DIAGNOSIS — M75.02 ADHESIVE CAPSULITIS OF LEFT SHOULDER: ICD-10-CM

## 2018-03-12 DIAGNOSIS — I73.00 RAYNAUD'S PHENOMENON WITHOUT GANGRENE: ICD-10-CM

## 2018-03-12 DIAGNOSIS — M35.00 SJOGREN'S SYNDROME, WITH UNSPECIFIED ORGAN INVOLVEMENT: Primary | ICD-10-CM

## 2018-03-12 DIAGNOSIS — Z79.899 LONG-TERM USE OF HYDROXYCHLOROQUINE: ICD-10-CM

## 2018-03-12 PROCEDURE — 99214 OFFICE O/P EST MOD 30 MIN: CPT | Mod: S$GLB,,, | Performed by: INTERNAL MEDICINE

## 2018-03-12 PROCEDURE — 99999 PR PBB SHADOW E&M-EST. PATIENT-LVL III: CPT | Mod: PBBFAC,,, | Performed by: INTERNAL MEDICINE

## 2018-03-12 ASSESSMENT — ROUTINE ASSESSMENT OF PATIENT INDEX DATA (RAPID3)
WHEN YOU AWAKENED IN THE MORNING OVER THE LAST WEEK, PLEASE INDICATE THE AMOUNT OF TIME IT TAKES UNTIL YOU ARE AS LIMBER AS YOU WILL BE FOR THE DAY: 20 SECONDS
PAIN SCORE: 0
TOTAL RAPID3 SCORE: 0
AM STIFFNESS SCORE: 1, YES
MDHAQ FUNCTION SCORE: 0
PATIENT GLOBAL ASSESSMENT SCORE: 0
PSYCHOLOGICAL DISTRESS SCORE: 0
FATIGUE SCORE: 0

## 2018-03-13 ENCOUNTER — LAB VISIT (OUTPATIENT)
Dept: LAB | Facility: HOSPITAL | Age: 53
End: 2018-03-13
Attending: INTERNAL MEDICINE
Payer: COMMERCIAL

## 2018-03-13 DIAGNOSIS — M35.00 SJOGREN'S SYNDROME, WITH UNSPECIFIED ORGAN INVOLVEMENT: ICD-10-CM

## 2018-03-13 DIAGNOSIS — I73.00 RAYNAUD'S PHENOMENON WITHOUT GANGRENE: ICD-10-CM

## 2018-03-13 LAB
BILIRUB UR QL STRIP: NEGATIVE
CLARITY UR: CLEAR
COLOR UR: YELLOW
CREAT UR-MCNC: 17 MG/DL
GLUCOSE UR QL STRIP: NEGATIVE
HGB UR QL STRIP: NEGATIVE
KETONES UR QL STRIP: NEGATIVE
LEUKOCYTE ESTERASE UR QL STRIP: NEGATIVE
NITRITE UR QL STRIP: NEGATIVE
PH UR STRIP: 6 [PH] (ref 5–8)
PROT UR QL STRIP: NEGATIVE
PROT UR-MCNC: <7 MG/DL
PROT/CREAT RATIO, UR: NORMAL
SP GR UR STRIP: <=1.005 (ref 1–1.03)
URN SPEC COLLECT METH UR: ABNORMAL

## 2018-03-13 PROCEDURE — 81003 URINALYSIS AUTO W/O SCOPE: CPT | Mod: PO

## 2018-03-13 PROCEDURE — 82570 ASSAY OF URINE CREATININE: CPT

## 2018-03-22 RX ORDER — LOSARTAN POTASSIUM 25 MG/1
TABLET ORAL
Qty: 30 TABLET | Refills: 5 | Status: SHIPPED | OUTPATIENT
Start: 2018-03-22 | End: 2018-09-14 | Stop reason: SDUPTHER

## 2018-07-08 RX ORDER — AMLODIPINE BESYLATE 2.5 MG/1
TABLET ORAL
Qty: 90 TABLET | Refills: 1 | Status: SHIPPED | OUTPATIENT
Start: 2018-07-08 | End: 2018-09-14 | Stop reason: SDUPTHER

## 2018-07-23 DIAGNOSIS — M35.00 SJOGREN'S SYNDROME: ICD-10-CM

## 2018-07-23 RX ORDER — HYDROXYCHLOROQUINE SULFATE 200 MG/1
TABLET, FILM COATED ORAL
Qty: 180 TABLET | Refills: 0 | Status: SHIPPED | OUTPATIENT
Start: 2018-07-23 | End: 2018-10-15 | Stop reason: SDUPTHER

## 2018-07-27 ENCOUNTER — TELEPHONE (OUTPATIENT)
Dept: CARDIOLOGY | Facility: CLINIC | Age: 53
End: 2018-07-27

## 2018-07-27 NOTE — TELEPHONE ENCOUNTER
----- Message from Yany Coeras sent at 7/27/2018 11:49 AM CDT -----  Contact: Self  Patient is due for her annual in mid Jan 2019 but has a notice to schedule labs and make appt for 9/12 as a 8 mo f/u.  Can this Sept appt move to her annual date?  Call back at [phone] (home).  Thank you!

## 2018-09-07 ENCOUNTER — LAB VISIT (OUTPATIENT)
Dept: LAB | Facility: HOSPITAL | Age: 53
End: 2018-09-07
Attending: INTERNAL MEDICINE
Payer: COMMERCIAL

## 2018-09-07 DIAGNOSIS — I10 ESSENTIAL HYPERTENSION: ICD-10-CM

## 2018-09-07 LAB
ANION GAP SERPL CALC-SCNC: 7 MMOL/L
BUN SERPL-MCNC: 18 MG/DL
CALCIUM SERPL-MCNC: 9.6 MG/DL
CHLORIDE SERPL-SCNC: 108 MMOL/L
CO2 SERPL-SCNC: 24 MMOL/L
CREAT SERPL-MCNC: 1 MG/DL
EST. GFR  (AFRICAN AMERICAN): >60 ML/MIN/1.73 M^2
EST. GFR  (NON AFRICAN AMERICAN): >60 ML/MIN/1.73 M^2
GLUCOSE SERPL-MCNC: 95 MG/DL
POTASSIUM SERPL-SCNC: 4.7 MMOL/L
SODIUM SERPL-SCNC: 139 MMOL/L

## 2018-09-07 PROCEDURE — 36415 COLL VENOUS BLD VENIPUNCTURE: CPT | Mod: PO

## 2018-09-07 PROCEDURE — 80048 BASIC METABOLIC PNL TOTAL CA: CPT

## 2018-09-14 ENCOUNTER — OFFICE VISIT (OUTPATIENT)
Dept: CARDIOLOGY | Facility: CLINIC | Age: 53
End: 2018-09-14
Payer: COMMERCIAL

## 2018-09-14 VITALS
WEIGHT: 156.75 LBS | HEIGHT: 66 IN | BODY MASS INDEX: 25.19 KG/M2 | HEART RATE: 60 BPM | DIASTOLIC BLOOD PRESSURE: 86 MMHG | SYSTOLIC BLOOD PRESSURE: 134 MMHG

## 2018-09-14 DIAGNOSIS — I08.0 MITRAL AND AORTIC REGURGITATION: ICD-10-CM

## 2018-09-14 DIAGNOSIS — I73.00 RAYNAUD'S PHENOMENON WITHOUT GANGRENE: ICD-10-CM

## 2018-09-14 DIAGNOSIS — I10 ESSENTIAL HYPERTENSION: Primary | ICD-10-CM

## 2018-09-14 PROCEDURE — 99214 OFFICE O/P EST MOD 30 MIN: CPT | Mod: S$GLB,,, | Performed by: INTERNAL MEDICINE

## 2018-09-14 PROCEDURE — 99999 PR PBB SHADOW E&M-EST. PATIENT-LVL III: CPT | Mod: PBBFAC,,, | Performed by: INTERNAL MEDICINE

## 2018-09-14 RX ORDER — AMLODIPINE BESYLATE 2.5 MG/1
2.5 TABLET ORAL DAILY
Qty: 90 TABLET | Refills: 2 | Status: SHIPPED | OUTPATIENT
Start: 2018-09-14 | End: 2019-06-17 | Stop reason: SDUPTHER

## 2018-09-14 RX ORDER — LOSARTAN POTASSIUM 25 MG/1
TABLET ORAL
Qty: 90 TABLET | Refills: 2 | Status: SHIPPED | OUTPATIENT
Start: 2018-09-14 | End: 2019-06-17 | Stop reason: SDUPTHER

## 2018-09-14 RX ORDER — ESTRADIOL 0.5 MG/1
0.5 TABLET ORAL DAILY
COMMUNITY
End: 2022-04-11 | Stop reason: SDUPTHER

## 2018-09-14 NOTE — PROGRESS NOTES
Subjective:    Patient ID:  Shanta Sterling is a 52 y.o. female who presents for Hypertension (labs) and Valvular Heart Disease        HPI  DISCUSSED LABS AND GOALS, BMP OK, BP LOG OK, NO CP, NO SOB, SEE ROS    Past Medical History:   Diagnosis Date    Acid reflux     Acute gastritis     Dry eyes     Dry mouth     Heart murmur     Hypertension     [facility]onia 2000    [facility]    Sicca syndrome, unspecified     Sjogren's disease     Valvular regurgitation      Past Surgical History:   Procedure Laterality Date    APPENDECTOMY  11/2009         BREAST SURGERY Bilateral 04/2004    Augmentation    FOOT NEUROMA SURGERY Left 1992    HYSTERECTOMY  2002     Family History   Problem Relation Age of Onset    Hypertension Mother     COPD Father     Diabetes Mellitus Father     Heart disease Father     Hypertension Father      Social History     Socioeconomic History    Marital status:      Spouse name: Not on file    Number of children: Not on file    Years of education: Not on file    Highest education level: Not on file   Social Needs    Financial resource strain: Not on file    Food insecurity - worry: Not on file    Food insecurity - inability: Not on file    Transportation needs - medical: Not on file    Transportation needs - non-medical: Not on file   Occupational History    Not on file   Tobacco Use    Smoking status: Never Smoker    Smokeless tobacco: Never Used   Substance and Sexual Activity    Alcohol use: Yes     Comment: one drink every deb 6 days/week    Drug use: No    Sexual activity: Not on file   Other Topics Concern    Not on file   Social History Narrative    Not on file       Review of patient's allergies indicates:   Allergen Reactions    Codeine Other (See Comments)     vomiting       Current Outpatient Medications:     amLODIPine (NORVASC) 2.5 MG tablet, Take 1 tablet (2.5 mg total) by mouth once daily., Disp: 90 tablet, Rfl: 2    calcium  citrate-vitamin D3 315-200 mg (CITRACAL+D) 315-200 mg-unit per tablet, Take 1 tablet by mouth once daily., Disp: , Rfl:     carboxymethylcellulose sodium (THERA TEARS) 0.25 % Drop, 1 drop as needed., Disp: , Rfl:     cevimeline (EVOXAC) 30 mg capsule, TAKE 1 CAPSULE BY MOUTH TWICE DAILY, Disp: 180 capsule, Rfl: 2    coenzyme Q10 (CO Q-10) 200 mg capsule, Take 200 mg by mouth once daily., Disp: , Rfl:     estradiol (ESTRACE) 0.5 MG tablet, Take 0.5 mg by mouth once daily., Disp: , Rfl:     hydroxychloroquine (PLAQUENIL) 200 mg tablet, TAKE 1 TABLET BY MOUTH TWICE DAILY, Disp: 180 tablet, Rfl: 0    multivitamin (MULTIVITAMIN) per tablet, Take 1 tablet by mouth once daily., Disp: , Rfl:     PROAIR RESPICLICK 90 mcg/actuation AePB, INHALE 2 PUFFS PO 30 MINUTES BEFORE EXERCISE, Disp: , Rfl: 5    ranitidine (ZANTAC) 150 MG tablet, Take 150 mg by mouth nightly., Disp: , Rfl:     losartan (COZAAR) 25 MG tablet, TAKE 1 TABLET(25 MG) BY MOUTH EVERY EVENING, Disp: 30 tablet, Rfl: 5    Review of Systems   Constitution: Negative for chills, decreased appetite, diaphoresis, fever, weakness, malaise/fatigue and night sweats.   HENT: Negative for congestion and nosebleeds.    Eyes: Negative for blurred vision, double vision and visual disturbance.   Cardiovascular: Negative for chest pain, claudication, cyanosis, dyspnea on exertion (MINIMAL), irregular heartbeat, leg swelling, near-syncope, orthopnea, palpitations, paroxysmal nocturnal dyspnea and syncope.        MUCH BETTER RAYNAUD'S SX   Respiratory: Negative for cough, hemoptysis, shortness of breath and wheezing.    Endocrine: Negative for cold intolerance, heat intolerance and polyuria.   Hematologic/Lymphatic: Negative for adenopathy and bleeding problem. Does not bruise/bleed easily.   Skin: Negative for color change and itching.   Musculoskeletal: Negative for back pain and falls. Joint pain: MILD SHOULDER.   Gastrointestinal: Negative for abdominal pain, change  "in bowel habit, dysphagia, heartburn, hematemesis, jaundice, melena and vomiting.   Genitourinary: Negative for dysuria, flank pain and frequency.   Neurological: Negative for brief paralysis, dizziness, focal weakness, light-headedness, loss of balance, numbness and tremors.   Psychiatric/Behavioral: Negative for altered mental status, depression and memory loss.   Allergic/Immunologic: Negative for persistent infections.        Objective:      Vitals:    09/14/18 1226   BP: 134/86   Pulse: 60   Weight: 71.1 kg (156 lb 12 oz)   Height: 5' 6" (1.676 m)   PainSc: 0-No pain     Body mass index is 25.3 kg/m².    Physical Exam   Constitutional: She is oriented to person, place, and time. She appears well-developed and well-nourished. She is active.   HENT:   Head: Normocephalic and atraumatic.   Mouth/Throat: Oropharynx is clear and moist and mucous membranes are normal.   Eyes: Conjunctivae and EOM are normal. Pupils are equal, round, and reactive to light.   Neck: Trachea normal and normal range of motion. Neck supple. Normal carotid pulses, no hepatojugular reflux and no JVD present. Carotid bruit is not present. No tracheal deviation, no edema and no erythema present. No thyromegaly present.   Cardiovascular: Normal rate and regular rhythm.  No extrasystoles are present. PMI is not displaced. Exam reveals no gallop and no friction rub.   Murmur heard.  High-pitched blowing holosystolic murmur is present at the apex.  High-pitched blowing decrescendo early diastolic murmur is present at the upper right sternal border radiating to the apex.  Pulses:       Carotid pulses are 2+ on the right side, and 2+ on the left side.       Radial pulses are 2+ on the right side, and 2+ on the left side.        Femoral pulses are 2+ on the right side, and 2+ on the left side.       Popliteal pulses are 2+ on the right side, and 2+ on the left side.        Dorsalis pedis pulses are 2+ on the right side, and 2+ on the left side.       "  Posterior tibial pulses are 2+ on the right side, and 2+ on the left side.   CAPILLARY REFILL IN TOES, FINGERS OK   Pulmonary/Chest: Effort normal and breath sounds normal. No tachypnea and no bradypnea. She has no wheezes. She has no rales. She exhibits no tenderness.   Abdominal: Soft. Bowel sounds are normal. She exhibits no distension and no mass. There is no hepatosplenomegaly. There is no tenderness. There is no guarding and no CVA tenderness.   Musculoskeletal: Normal range of motion. She exhibits no edema or tenderness.   Lymphadenopathy:     She has no cervical adenopathy.   Neurological: She is alert and oriented to person, place, and time. She has normal strength. She displays no tremor. No cranial nerve deficit.   Skin: Skin is warm and dry. No rash noted. No cyanosis or erythema. No pallor.   Psychiatric: She has a normal mood and affect. Her speech is normal and behavior is normal.               ..    Chemistry        Component Value Date/Time     09/07/2018 1118    K 4.7 09/07/2018 1118     09/07/2018 1118    CO2 24 09/07/2018 1118    BUN 18 09/07/2018 1118    CREATININE 1.0 09/07/2018 1118    GLU 95 09/07/2018 1118        Component Value Date/Time    CALCIUM 9.6 09/07/2018 1118    ALKPHOS 55 10/27/2015 0921    AST 23 10/27/2015 0921    ALT 19 10/27/2015 0921    BILITOT 0.7 10/27/2015 0921    ESTGFRAFRICA >60.0 09/07/2018 1118    EGFRNONAA >60.0 09/07/2018 1118            ..No results found for: CHOL  No results found for: HDL  No results found for: LDLCALC  No results found for: TRIG  No results found for: CHOLHDL  ..  Lab Results   Component Value Date    WBC 5.97 10/27/2015    HGB 12.9 10/27/2015    HCT 38.4 10/27/2015    MCV 90 10/27/2015     10/27/2015       Test(s) Reviewed  I have reviewed the following in detail:  [] Stress test   [] Angiography   [] Echocardiogram   [x] Labs   [] Other:       Assessment:         ICD-10-CM ICD-9-CM   1. Essential hypertension I10 401.9   2.  Mitral and aortic regurgitation I08.0 396.3   3. Raynaud's phenomenon without gangrene I73.00 443.0     Problem List Items Addressed This Visit        Cardiac/Vascular    Raynaud's phenomenon    Essential hypertension - Primary    Mitral and aortic regurgitation           Plan:     ALL CV CLINICALLY STABLE, NO ANGINA, NO HF, NO TIA, NO CLINICAL ARRHYTHMIA,CONTINUE CURRENT MEDS, EDUCATION, DIET, EXERCISE,       Essential hypertension    Mitral and aortic regurgitation    Raynaud's phenomenon without gangrene    RTC Low level/low impact aerobic exercise 5x's/wk. Heart healthy diet and risk factor modification.    See labs and med orders.    Aerobic exercise 5x's/wk. Heart healthy diet and risk factor modification.    See labs and med orders.

## 2018-09-15 DIAGNOSIS — M35.00 SJOGREN'S SYNDROME: ICD-10-CM

## 2018-09-16 RX ORDER — CEVIMELINE HYDROCHLORIDE 30 MG/1
CAPSULE ORAL
Qty: 180 CAPSULE | Refills: 0 | Status: SHIPPED | OUTPATIENT
Start: 2018-09-16 | End: 2018-11-25 | Stop reason: SDUPTHER

## 2018-10-14 NOTE — PROGRESS NOTES
"Subjective:       Patient ID: Shanta Sterling is a 52 y.o. female with Sjogren's syndrome.    Chief Complaint: No chief complaint on file.  She returns for follow up. 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     She was last seen on 3/12/18 & is on HCQ & evoxac. She is seeing Dr. Corbin and ophthalmologist for her eye problems. She could not tolerate restasis and she is on artificial tears and recently a very week steroid drop for mild inflammation. Her dry mouth is controlled on evoxac and she sees the dentist 2 x/year.  She is not having Raynaud's as the weather is warm, but she's also on amlodipine which seems to have helped. She has no ischemic changes, no digital scars, loss of pulp etc.  She has minimal AM stiffness & denies joint pain or swelling.  Denies dysphagia, tight skin, alopecia, pleurisy, pericarditis, photosensitivity, skin rashes.  Has had some oral ulcers; none lately. May have reactive airway disease. Still taking CoQ 100 mg which has helped myalgias (in past) considerably.      She is followed by Dr. Jefferson Rivers in Cardiology.  (Cardiology has documented heart murmurs and dx MR and AR: " High-pitched blowing holosystolic murmur is present  at the apex; High-pitched blowing decrescendo early diastolic murmur is present  at the upper right sternal border radiating to the apex" ).         Current Outpatient Medications   Medication Sig Dispense Refill    amLODIPine (NORVASC) 2.5 MG tablet Take 1 tablet (2.5 mg total) by mouth once daily. 90 tablet 2    calcium citrate-vitamin D3 315-200 mg (CITRACAL+D) 315-200 mg-unit per tablet Take 1 tablet by mouth once daily.      carboxymethylcellulose sodium (THERA TEARS) 0.25 % Drop 1 drop as needed.      cevimeline (EVOXAC) 30 mg capsule TAKE 1 CAPSULE BY MOUTH TWICE DAILY 180 capsule 0    coenzyme Q10 (CO Q-10) 200 mg " capsule Take 200 mg by mouth once daily.      estradiol (ESTRACE) 0.5 MG tablet Take 0.5 mg by mouth once daily.      hydroxychloroquine (PLAQUENIL) 200 mg tablet TAKE 1 TABLET BY MOUTH TWICE DAILY 180 tablet 0    losartan (COZAAR) 25 MG tablet TAKE 1 TABLET(25 MG) BY MOUTH EVERY EVENING 90 tablet 2    multivitamin (MULTIVITAMIN) per tablet Take 1 tablet by mouth once daily.      PROAIR RESPICLICK 90 mcg/actuation AePB INHALE 2 PUFFS PO 30 MINUTES BEFORE EXERCISE  5    ranitidine (ZANTAC) 150 MG tablet Take 150 mg by mouth nightly.       No current facility-administered medications for this visit.            Allergies   Allergen Reactions    Codeine Other (See Comments)     vomiting     Past Medical History:   Diagnosis Date    Acid reflux     Acute gastritis     Dry eyes     Dry mouth     Heart murmur     Hypertension     [facility]    Sicca syndrome, unspecified     Sjogren's disease     Valvular regurgitation      Past Surgical History:   Procedure Laterality Date    APPENDECTOMY  11/2009         BREAST SURGERY Bilateral 04/2004    Augmentation    FOOT NEUROMA SURGERY Left 1992    HYSTERECTOMY  2002         Review of Systems   Constitutional: Negative.  Negative for diaphoresis, fatigue, fever and unexpected weight change.   HENT: Positive for mouth sores. Negative for sore throat, tinnitus and trouble swallowing.         Dry mouth   Eyes: Positive for redness. Negative for visual disturbance.        Dry eyes   Respiratory: Negative.  Negative for cough, choking, chest tightness and shortness of breath.    Cardiovascular: Negative.  Negative for chest pain, palpitations and leg swelling.   Gastrointestinal: Negative.  Negative for abdominal distention, abdominal pain, blood in stool, constipation, diarrhea, nausea and vomiting.        Heartburn   Endocrine: Negative.    Genitourinary: Negative.  Negative for dysuria, frequency, genital sores, hematuria and menstrual  "problem.   Musculoskeletal: Negative.  Negative for back pain, joint swelling, myalgias, neck pain and neck stiffness.   Skin: Negative.  Negative for rash.   Allergic/Immunologic: Negative.    Neurological: Positive for headaches. Negative for dizziness, syncope, weakness, light-headedness and numbness.   Hematological: Negative.  Negative for adenopathy. Does not bruise/bleed easily.   Psychiatric/Behavioral: Negative.  Negative for dysphoric mood and sleep disturbance. The patient is not nervous/anxious.          Family History   Problem Relation Age of Onset    Hypertension Mother     COPD Father     Diabetes Mellitus Father     Heart disease Father     Hypertension Father        SH: non smoker; social drinker.  She has a new job as  at [employer] in [place].  Very active; tries to run 3 x/week.     Objective:   BP (!) 145/84   Pulse (!) 58   Ht 5' 6" (1.676 m)   Wt 73.6 kg (162 lb 4.1 oz)   BMI 26.19 kg/m²     Physical Exam   Vitals reviewed.  Constitutional: She is oriented to person, place, and time and well-developed, well-nourished, and in no distress. No distress.   HENT:   Head: Normocephalic and atraumatic.   Mouth/Throat: Oropharynx is clear and moist. No oropharyngeal exudate.   No facial rashes  Parotids not enlarged  No oral ulcers  Adequate moisture  Teeth in excellent repair.   Eyes: Conjunctivae and EOM are normal. Pupils are equal, round, and reactive to light. Right eye exhibits no discharge. Left eye exhibits no discharge. No scleral icterus.   Neck: Neck supple. No JVD present. No tracheal deviation present. No thyromegaly present.   Cardiovascular: Normal rate, regular rhythm, normal heart sounds and intact distal pulses.  Exam reveals no gallop and no friction rub.    No murmur heard.  Murmurs reported by cardiologist: "High-pitched blowing holosystolic murmur is present  at the apex  High-pitched blowing decrescendo early diastolic murmur is " "present  at the upper right sternal border radiating to the apex"   Pulmonary/Chest: Effort normal and breath sounds normal. No respiratory distress. She has no wheezes. She has no rales. She exhibits no tenderness.   Abdominal: Soft. Bowel sounds are normal. She exhibits no distension and no mass. There is no splenomegaly or hepatomegaly. There is no tenderness. There is no rebound and no guarding.   Lymphadenopathy:     She has no cervical adenopathy.        Right: No inguinal adenopathy present.        Left: No inguinal adenopathy present.   Neurological: She is alert and oriented to person, place, and time. She has normal reflexes. No cranial nerve deficit. Gait normal.   Proximal and distal muscle strength 5/5.   Skin: Skin is warm and dry. No rash noted. She is not diaphoretic.     Psychiatric: Mood, memory, affect and judgment normal.   Musculoskeletal: Normal range of motion. She exhibits no edema or tenderness.   Cspine FROM no tenderness  Tspine FROM no tenderness  Lspine FROM no tenderness.  TMJ: unremarkable  Shoulders: still missing about 10 degrees of abduction on L; non tender; otherwise ok; no synovitis; R ok;  Elbows: FROM; no synovitis; no tophi or nodules  Wrists: FROM; no synovitis;   MCPs: Minimal squaring of 1st C_MCP joints bilaterally. FROM; no synovitis; no metacarpalgia;  ok;  PIPs:FROM; no synovitis;   DIPs: FROM; no synovitis;   HIPS: FROM  Knees: FROM; no synovitis; no instability;  Ankles: FROM: no synovitis   Toes: ok; no metatarsalgia.             LABS:   18: BMP: cnne 1.0;   3/13/18: + MARIA DOLORES +SSA; rest of pro neg;   18: TFTs ok;   18: BMP cnne 1.1 GFR 57.5; BUN 21  10/27/15: ESR 3: CRP 2.2; CBC, CMP ok;   14: ESR 2; CRP 0.7; CBC ok; alb. 3.4;   13: ESR 10; CRP 0.7; CBC ok cnne 1.1; GFR 53;   MARIA DOLORES 1:160sp; +SSA; +SSB; +RF (53);   Neg or wnl: CCP, C3, C4, Scl-70; UA; LAC; aCLA; [id number]  Vit D 56     Imagin13: Arthritis survey personally reviewed: " unremarkable     Assessment:   Sjogren's syndrome since 7/11 on HCQ since 7/11--doing very well.   On  mg/d & evoxac 30 mg bid   +MARIA DOLORES, +SSA, +SSB, +RF    with dry eyes (intolerant to restasis),    dry mouth (on evoxac, intolerant to pilocarpine)    associated with polyarthritis (pain & swelling: hands, elbows, shoulders, knees & feet)     Feels CoQ 10   Occasional oral ulcers & upper dysphagia.    Raynaud's  & AM stiffness     RP helped by amlodipine 2.5 mg/d   mother with UCTD     L adhesive capsulitis with calcification head of humerus   Responsive to injection & some PT   Still w reduced ROM    Menopausal   S/P hysterectomy wo BSO   + vasomotor sxs-night sweats   On estradiol    Mitral and Aortic Insufficiency as per cardiology    Essential hypertension    Renal insufficiency precipitated by Aleve in past.          Plan:   Continue  mg/day + evoxac 30 mg tid.  Reviewed calcium & vitamin D requirements.  Calcium preferable through diet.  Vitamin D 5,000 IU once a week.  RTC 6 months or prn.

## 2018-10-15 DIAGNOSIS — M35.00 SJOGREN'S SYNDROME: ICD-10-CM

## 2018-10-15 RX ORDER — HYDROXYCHLOROQUINE SULFATE 200 MG/1
TABLET, FILM COATED ORAL
Qty: 180 TABLET | Refills: 0 | Status: SHIPPED | OUTPATIENT
Start: 2018-10-15 | End: 2019-01-29 | Stop reason: SDUPTHER

## 2018-10-17 ENCOUNTER — OFFICE VISIT (OUTPATIENT)
Dept: RHEUMATOLOGY | Facility: CLINIC | Age: 53
End: 2018-10-17
Payer: COMMERCIAL

## 2018-10-17 VITALS
BODY MASS INDEX: 26.08 KG/M2 | HEIGHT: 66 IN | WEIGHT: 162.25 LBS | SYSTOLIC BLOOD PRESSURE: 145 MMHG | HEART RATE: 58 BPM | DIASTOLIC BLOOD PRESSURE: 84 MMHG

## 2018-10-17 DIAGNOSIS — I73.00 RAYNAUD'S PHENOMENON WITHOUT GANGRENE: ICD-10-CM

## 2018-10-17 DIAGNOSIS — M35.00 SJOGREN'S SYNDROME, WITH UNSPECIFIED ORGAN INVOLVEMENT: Primary | ICD-10-CM

## 2018-10-17 DIAGNOSIS — Z79.899 LONG-TERM USE OF HYDROXYCHLOROQUINE: ICD-10-CM

## 2018-10-17 PROCEDURE — 99214 OFFICE O/P EST MOD 30 MIN: CPT | Mod: S$GLB,,, | Performed by: INTERNAL MEDICINE

## 2018-10-17 PROCEDURE — 99999 PR PBB SHADOW E&M-EST. PATIENT-LVL III: CPT | Mod: PBBFAC,,, | Performed by: INTERNAL MEDICINE

## 2018-10-17 ASSESSMENT — ROUTINE ASSESSMENT OF PATIENT INDEX DATA (RAPID3)
AM STIFFNESS SCORE: 0, NO
FATIGUE SCORE: 0
PAIN SCORE: 0
MDHAQ FUNCTION SCORE: 0
PATIENT GLOBAL ASSESSMENT SCORE: 0
PSYCHOLOGICAL DISTRESS SCORE: 0
TOTAL RAPID3 SCORE: 0

## 2018-10-17 NOTE — PATIENT INSTRUCTIONS
You need about 1200 mg of calcium/day.  You get about 250 mg from various non dairy sources.  Dairy is the best form of calcium.  Read labels.  Wittmann Milk (Wittmann Breeze) has 455 mg/serving.    Take vitamin D3 5,000 IU once a week.    Drink fluids.  Avoid antiinflammatory drugs.

## 2018-11-25 DIAGNOSIS — M35.00 SJOGREN'S SYNDROME: ICD-10-CM

## 2018-11-26 RX ORDER — CEVIMELINE HYDROCHLORIDE 30 MG/1
CAPSULE ORAL
Qty: 180 CAPSULE | Refills: 0 | Status: SHIPPED | OUTPATIENT
Start: 2018-11-26 | End: 2019-01-28 | Stop reason: SDUPTHER

## 2019-01-28 DIAGNOSIS — M35.00 SJOGREN'S SYNDROME: ICD-10-CM

## 2019-01-28 RX ORDER — CEVIMELINE HYDROCHLORIDE 30 MG/1
30 CAPSULE ORAL 2 TIMES DAILY
Qty: 180 CAPSULE | Refills: 0 | Status: SHIPPED | OUTPATIENT
Start: 2019-01-28 | End: 2019-04-02 | Stop reason: SDUPTHER

## 2019-01-29 DIAGNOSIS — M35.00 SJOGREN'S SYNDROME: ICD-10-CM

## 2019-01-30 RX ORDER — HYDROXYCHLOROQUINE SULFATE 200 MG/1
200 TABLET, FILM COATED ORAL 2 TIMES DAILY
Qty: 180 TABLET | Refills: 3 | Status: SHIPPED | OUTPATIENT
Start: 2019-01-30 | End: 2019-04-02 | Stop reason: SDUPTHER

## 2019-03-30 NOTE — PROGRESS NOTES
"Subjective:       Patient ID: Shanta Sterling is a 53 y.o. female with Sjogren's syndrome.    Chief Complaint: No chief complaint on file.  She returns for follow up. 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    She was last seen on 10/17/18. She is doing very well. On  mg qd with eye exams q 6 months and evoxac. She could not tolerate restasis. Uses OTC eye drops.  Her dry mouth is controlled on evoxac and she sees the dentist 2 x/year.  She gets Raynaud's but it is controlled on amlodipine. She has no ischemic changes, no digital scars, loss of pulp etc.  She has minimal AM stiffness (1 minute) & denies joint pain or swelling but still has limited L shoulder movement. .  Denies dysphagia, tight skin, alopecia, pleurisy, pericarditis, photosensitivity, skin rashes.  Has had some oral ulcers; none lately. May have reactive airway disease. Still taking CoQ 100 mg which has helped myalgias (in past) considerably.      She is followed by Dr. Jefferson Rivers in Cardiology.  (Cardiology has documented heart murmurs and dx MR and AR: " High-pitched blowing holosystolic murmur is present  at the apex; High-pitched blowing decrescendo early diastolic murmur is present  at the upper right sternal border radiating to the apex" ).       Current Outpatient Medications   Medication Sig Dispense Refill    amLODIPine (NORVASC) 2.5 MG tablet Take 1 tablet (2.5 mg total) by mouth once daily. 90 tablet 2    carboxymethylcellulose sodium (THERA TEARS) 0.25 % Drop 1 drop as needed.      cevimeline (EVOXAC) 30 mg capsule Take 1 capsule (30 mg total) by mouth 2 (two) times daily. 180 capsule 3    coenzyme Q10 (CO Q-10) 200 mg capsule Take 200 mg by mouth once daily.      estradiol (ESTRACE) 0.5 MG tablet Take 0.5 mg by mouth once daily.      hydroxychloroquine (PLAQUENIL) 200 mg tablet Take 1 tablet (200 mg total) by mouth 2 (two) times daily. 180 " tablet 3    losartan (COZAAR) 25 MG tablet TAKE 1 TABLET(25 MG) BY MOUTH EVERY EVENING 90 tablet 2    multivitamin (MULTIVITAMIN) per tablet Take 1 tablet by mouth once daily.      PROAIR RESPICLICK 90 mcg/actuation AePB INHALE 2 PUFFS PO 30 MINUTES BEFORE EXERCISE  5    ranitidine (ZANTAC) 150 MG tablet Take 150 mg by mouth nightly.      calcium citrate-vitamin D3 315-200 mg (CITRACAL+D) 315-200 mg-unit per tablet Take 1 tablet by mouth once daily.       No current facility-administered medications for this visit.      Not taking calcium citrate: takes calcium through the diet.         Allergies   Allergen Reactions    Codeine Other (See Comments)     vomiting     Past Medical History:   Diagnosis Date    Acid reflux     Acute gastritis     Dry eyes     Dry mouth     Heart murmur     Hypertension     [facility] 2000    [facility]    Sicca syndrome, unspecified     Sjogren's disease     Valvular regurgitation      Past Surgical History:   Procedure Laterality Date    APPENDECTOMY  11/2009         BREAST SURGERY Bilateral 04/2004    Augmentation    FOOT NEUROMA SURGERY Left 1992    HYSTERECTOMY  2002         Review of Systems   Constitutional: Negative.  Negative for diaphoresis, fatigue, fever and unexpected weight change.   HENT: Negative.  Negative for sore throat, tinnitus and trouble swallowing.         Dry mouth   Eyes: Positive for redness. Negative for visual disturbance.        Dry eyes   Respiratory: Negative.  Negative for cough, choking, chest tightness and shortness of breath.    Cardiovascular: Negative.  Negative for chest pain, palpitations and leg swelling.   Gastrointestinal: Positive for constipation. Negative for abdominal distention, abdominal pain, blood in stool, diarrhea, nausea and vomiting.        Heartburn   Endocrine: Negative.    Genitourinary: Negative.  Negative for dysuria, frequency, genital sores, hematuria and menstrual problem.   Musculoskeletal:  "Negative.  Negative for back pain, joint swelling, myalgias, neck pain and neck stiffness.   Skin: Negative.  Negative for rash.   Allergic/Immunologic: Negative.    Neurological: Positive for headaches. Negative for dizziness, syncope, weakness, light-headedness and numbness.   Hematological: Negative.  Negative for adenopathy. Does not bruise/bleed easily.   Psychiatric/Behavioral: Negative.  Negative for dysphoric mood and sleep disturbance. The patient is not nervous/anxious.          Family History   Problem Relation Age of Onset    Hypertension Mother     COPD Father     Diabetes Mellitus Father     Heart disease Father     Hypertension Father        SH: non smoker; social drinker.  She has a new job as  at [employer] in [place].  Very active; tries to run 3 x/week.     Objective:   BP (!) 135/92   Pulse 61   Ht 5' 6" (1.676 m)   Wt 73.6 kg (162 lb 4.1 oz)   BMI 26.19 kg/m²     Physical Exam   Vitals reviewed.  Constitutional: She is oriented to person, place, and time and well-developed, well-nourished, and in no distress. No distress.   HENT:   Head: Normocephalic and atraumatic.   Mouth/Throat: Oropharynx is clear and moist. No oropharyngeal exudate.   No facial rashes  Parotids not enlarged  No oral ulcers  Adequate moisture  Teeth in excellent repair.   Eyes: Conjunctivae and EOM are normal. Pupils are equal, round, and reactive to light. Right eye exhibits no discharge. Left eye exhibits no discharge. No scleral icterus.   Neck: Neck supple. No JVD present. No tracheal deviation present. No thyromegaly present.   Cardiovascular: Normal rate, regular rhythm, normal heart sounds and intact distal pulses.  Exam reveals no gallop and no friction rub.    No murmur heard.  Murmurs reported by cardiologist: "High-pitched blowing holosystolic murmur is present  at the apex  High-pitched blowing decrescendo early diastolic murmur is present  at the upper right sternal " "border radiating to the apex"   Pulmonary/Chest: Effort normal and breath sounds normal. No respiratory distress. She has no wheezes. She has no rales. She exhibits no tenderness.   Abdominal: Soft. Bowel sounds are normal. She exhibits no distension and no mass. There is no splenomegaly or hepatomegaly. There is no tenderness. There is no rebound and no guarding.   Lymphadenopathy:     She has no cervical adenopathy.        Right: No inguinal adenopathy present.        Left: No inguinal adenopathy present.   Neurological: She is alert and oriented to person, place, and time. She has normal reflexes. No cranial nerve deficit. Gait normal.   Proximal and distal muscle strength 5/5.   Skin: Skin is warm and dry. No rash noted. She is not diaphoretic.     Psychiatric: Mood, memory, affect and judgment normal.   Musculoskeletal: Normal range of motion. She exhibits no edema or tenderness.   Cspine FROM no tenderness  Tspine FROM no tenderness  Lspine FROM no tenderness.  TMJ: unremarkable  Shoulders: still missing about 10 degrees of abduction on L; non tender; otherwise ok; no synovitis; R ok;  Elbows: FROM; no synovitis; no tophi or nodules  Wrists: FROM; no synovitis;   MCPs: Minimal squaring of 1st C_MCP joints bilaterally. FROM; no synovitis; no metacarpalgia;  ok;  PIPs:FROM; no synovitis;   DIPs: FROM; no synovitis;   HIPS: FROM  Knees: FROM; no synovitis; no instability;  Ankles: FROM: no synovitis   Toes: ok; no metatarsalgia.             LABS:   18: BMP: cnne 1.0;   3/13/18: + MARIA DOLORES +SSA; rest of pro neg;   18: TFTs ok;   18: BMP cnne 1.1 GFR 57.5; BUN 21  10/27/15: ESR 3: CRP 2.2; CBC, CMP ok;   14: ESR 2; CRP 0.7; CBC ok; alb. 3.4;   13: ESR 10; CRP 0.7; CBC ok cnne 1.1; GFR 53;   MARIA DOLORES 1:160sp; +SSA; +SSB; +RF (53);   Neg or wnl: CCP, C3, C4, Scl-70; UA; LAC; aCLA; [id number]  Vit D 56     Imagin13: Arthritis survey personally reviewed: unremarkable     Assessment: "   Sjogren's syndrome since 7/11 on HCQ since 7/11--doing very well.   On  mg/d & evoxac 30 mg bid   +MARIA DOLORES, +SSA, +SSB, +RF    with dry eyes (intolerant to restasis),    dry mouth (on evoxac, intolerant to pilocarpine)    associated with polyarthritis (pain & swelling: hands, elbows, shoulders, knees & feet)     Feels CoQ 10   Occasional oral ulcers & upper dysphagia.    Raynaud's  & AM stiffness     RP helped by amlodipine 2.5 mg/d   mother with UCTD     L adhesive capsulitis with calcification head of humerus   Responsive to injection & some PT   Still w reduced ROM    Menopausal   S/P hysterectomy wo BSO   + vasomotor sxs-night sweats   On estradiol    Mitral and Aortic Insufficiency as per cardiology    Essential hypertension    Renal insufficiency precipitated by Aleve in past.          Plan:   Continue  mg/day + evoxac 30 mg bid.  Eye exams q 6-12 months  Vitamin D 5,000 IU once a week.  ROM exercises for L shoulder shown.  RTC 6-8 months or prn.

## 2019-04-02 ENCOUNTER — OFFICE VISIT (OUTPATIENT)
Dept: RHEUMATOLOGY | Facility: CLINIC | Age: 54
End: 2019-04-02
Payer: COMMERCIAL

## 2019-04-02 VITALS
DIASTOLIC BLOOD PRESSURE: 92 MMHG | SYSTOLIC BLOOD PRESSURE: 135 MMHG | WEIGHT: 162.25 LBS | BODY MASS INDEX: 26.08 KG/M2 | HEART RATE: 61 BPM | HEIGHT: 66 IN

## 2019-04-02 DIAGNOSIS — M35.00 SJOGREN'S SYNDROME: ICD-10-CM

## 2019-04-02 DIAGNOSIS — M75.02 ADHESIVE CAPSULITIS OF LEFT SHOULDER: ICD-10-CM

## 2019-04-02 DIAGNOSIS — I73.00 RAYNAUD'S PHENOMENON WITHOUT GANGRENE: ICD-10-CM

## 2019-04-02 DIAGNOSIS — M35.00 SJOGREN'S SYNDROME, WITH UNSPECIFIED ORGAN INVOLVEMENT: Primary | ICD-10-CM

## 2019-04-02 DIAGNOSIS — Z79.899 LONG-TERM USE OF HYDROXYCHLOROQUINE: ICD-10-CM

## 2019-04-02 PROCEDURE — 3075F SYST BP GE 130 - 139MM HG: CPT | Mod: CPTII,S$GLB,, | Performed by: INTERNAL MEDICINE

## 2019-04-02 PROCEDURE — 99214 OFFICE O/P EST MOD 30 MIN: CPT | Mod: S$GLB,,, | Performed by: INTERNAL MEDICINE

## 2019-04-02 PROCEDURE — 3075F PR MOST RECENT SYSTOLIC BLOOD PRESS GE 130-139MM HG: ICD-10-PCS | Mod: CPTII,S$GLB,, | Performed by: INTERNAL MEDICINE

## 2019-04-02 PROCEDURE — 3080F DIAST BP >= 90 MM HG: CPT | Mod: CPTII,S$GLB,, | Performed by: INTERNAL MEDICINE

## 2019-04-02 PROCEDURE — 3008F PR BODY MASS INDEX (BMI) DOCUMENTED: ICD-10-PCS | Mod: CPTII,S$GLB,, | Performed by: INTERNAL MEDICINE

## 2019-04-02 PROCEDURE — 3008F BODY MASS INDEX DOCD: CPT | Mod: CPTII,S$GLB,, | Performed by: INTERNAL MEDICINE

## 2019-04-02 PROCEDURE — 99999 PR PBB SHADOW E&M-EST. PATIENT-LVL III: CPT | Mod: PBBFAC,,, | Performed by: INTERNAL MEDICINE

## 2019-04-02 PROCEDURE — 99999 PR PBB SHADOW E&M-EST. PATIENT-LVL III: ICD-10-PCS | Mod: PBBFAC,,, | Performed by: INTERNAL MEDICINE

## 2019-04-02 PROCEDURE — 99214 PR OFFICE/OUTPT VISIT, EST, LEVL IV, 30-39 MIN: ICD-10-PCS | Mod: S$GLB,,, | Performed by: INTERNAL MEDICINE

## 2019-04-02 PROCEDURE — 3080F PR MOST RECENT DIASTOLIC BLOOD PRESSURE >= 90 MM HG: ICD-10-PCS | Mod: CPTII,S$GLB,, | Performed by: INTERNAL MEDICINE

## 2019-04-02 RX ORDER — CEVIMELINE HYDROCHLORIDE 30 MG/1
30 CAPSULE ORAL 2 TIMES DAILY
Qty: 180 CAPSULE | Refills: 3 | Status: SHIPPED | OUTPATIENT
Start: 2019-04-02 | End: 2020-01-08

## 2019-04-02 RX ORDER — HYDROXYCHLOROQUINE SULFATE 200 MG/1
200 TABLET, FILM COATED ORAL 2 TIMES DAILY
Qty: 180 TABLET | Refills: 3 | Status: SHIPPED | OUTPATIENT
Start: 2019-04-02 | End: 2020-01-08 | Stop reason: SDUPTHER

## 2019-04-02 ASSESSMENT — ROUTINE ASSESSMENT OF PATIENT INDEX DATA (RAPID3)
PSYCHOLOGICAL DISTRESS SCORE: 0
FATIGUE SCORE: 0
TOTAL RAPID3 SCORE: 0
PATIENT GLOBAL ASSESSMENT SCORE: 0
MDHAQ FUNCTION SCORE: 0
PAIN SCORE: 0

## 2019-06-12 ENCOUNTER — LAB VISIT (OUTPATIENT)
Dept: LAB | Facility: HOSPITAL | Age: 54
End: 2019-06-12
Attending: INTERNAL MEDICINE
Payer: COMMERCIAL

## 2019-06-12 DIAGNOSIS — I10 ESSENTIAL HYPERTENSION: ICD-10-CM

## 2019-06-12 LAB
ANION GAP SERPL CALC-SCNC: 10 MMOL/L (ref 8–16)
BUN SERPL-MCNC: 14 MG/DL (ref 6–20)
CALCIUM SERPL-MCNC: 9.3 MG/DL (ref 8.7–10.5)
CHLORIDE SERPL-SCNC: 104 MMOL/L (ref 95–110)
CO2 SERPL-SCNC: 26 MMOL/L (ref 23–29)
CREAT SERPL-MCNC: 1 MG/DL (ref 0.5–1.4)
EST. GFR  (AFRICAN AMERICAN): >60 ML/MIN/1.73 M^2
EST. GFR  (NON AFRICAN AMERICAN): >60 ML/MIN/1.73 M^2
GLUCOSE SERPL-MCNC: 75 MG/DL (ref 70–110)
POTASSIUM SERPL-SCNC: 4.5 MMOL/L (ref 3.5–5.1)
SODIUM SERPL-SCNC: 140 MMOL/L (ref 136–145)

## 2019-06-12 PROCEDURE — 80048 BASIC METABOLIC PNL TOTAL CA: CPT

## 2019-06-12 PROCEDURE — 36415 COLL VENOUS BLD VENIPUNCTURE: CPT | Mod: PO

## 2019-06-17 ENCOUNTER — OFFICE VISIT (OUTPATIENT)
Dept: CARDIOLOGY | Facility: CLINIC | Age: 54
End: 2019-06-17
Payer: COMMERCIAL

## 2019-06-17 VITALS
BODY MASS INDEX: 25.51 KG/M2 | WEIGHT: 158.75 LBS | DIASTOLIC BLOOD PRESSURE: 83 MMHG | HEIGHT: 66 IN | HEART RATE: 57 BPM | SYSTOLIC BLOOD PRESSURE: 139 MMHG

## 2019-06-17 DIAGNOSIS — I73.00 RAYNAUD'S PHENOMENON WITHOUT GANGRENE: ICD-10-CM

## 2019-06-17 DIAGNOSIS — I08.0 MITRAL AND AORTIC REGURGITATION: ICD-10-CM

## 2019-06-17 DIAGNOSIS — Z13.220 LIPID SCREENING: ICD-10-CM

## 2019-06-17 DIAGNOSIS — I10 ESSENTIAL HYPERTENSION: Primary | ICD-10-CM

## 2019-06-17 PROCEDURE — 3008F PR BODY MASS INDEX (BMI) DOCUMENTED: ICD-10-PCS | Mod: CPTII,S$GLB,, | Performed by: INTERNAL MEDICINE

## 2019-06-17 PROCEDURE — 99214 PR OFFICE/OUTPT VISIT, EST, LEVL IV, 30-39 MIN: ICD-10-PCS | Mod: S$GLB,,, | Performed by: INTERNAL MEDICINE

## 2019-06-17 PROCEDURE — 3008F BODY MASS INDEX DOCD: CPT | Mod: CPTII,S$GLB,, | Performed by: INTERNAL MEDICINE

## 2019-06-17 PROCEDURE — 3075F PR MOST RECENT SYSTOLIC BLOOD PRESS GE 130-139MM HG: ICD-10-PCS | Mod: CPTII,S$GLB,, | Performed by: INTERNAL MEDICINE

## 2019-06-17 PROCEDURE — 3079F PR MOST RECENT DIASTOLIC BLOOD PRESSURE 80-89 MM HG: ICD-10-PCS | Mod: CPTII,S$GLB,, | Performed by: INTERNAL MEDICINE

## 2019-06-17 PROCEDURE — 99999 PR PBB SHADOW E&M-EST. PATIENT-LVL III: ICD-10-PCS | Mod: PBBFAC,,, | Performed by: INTERNAL MEDICINE

## 2019-06-17 PROCEDURE — 99214 OFFICE O/P EST MOD 30 MIN: CPT | Mod: S$GLB,,, | Performed by: INTERNAL MEDICINE

## 2019-06-17 PROCEDURE — 3079F DIAST BP 80-89 MM HG: CPT | Mod: CPTII,S$GLB,, | Performed by: INTERNAL MEDICINE

## 2019-06-17 PROCEDURE — 99999 PR PBB SHADOW E&M-EST. PATIENT-LVL III: CPT | Mod: PBBFAC,,, | Performed by: INTERNAL MEDICINE

## 2019-06-17 PROCEDURE — 3075F SYST BP GE 130 - 139MM HG: CPT | Mod: CPTII,S$GLB,, | Performed by: INTERNAL MEDICINE

## 2019-06-17 RX ORDER — AMLODIPINE BESYLATE 5 MG/1
5 TABLET ORAL DAILY
Qty: 90 TABLET | Refills: 2 | Status: SHIPPED | OUTPATIENT
Start: 2019-06-17 | End: 2020-03-10

## 2019-06-17 RX ORDER — LOSARTAN POTASSIUM 25 MG/1
TABLET ORAL
Qty: 90 TABLET | Refills: 2 | Status: SHIPPED | OUTPATIENT
Start: 2019-06-17 | End: 2020-09-21

## 2019-06-17 RX ORDER — AMLODIPINE BESYLATE 2.5 MG/1
2.5 TABLET ORAL DAILY
Qty: 90 TABLET | Refills: 2 | Status: SHIPPED | OUTPATIENT
Start: 2019-06-17 | End: 2019-06-17 | Stop reason: DRUGHIGH

## 2019-06-17 NOTE — PROGRESS NOTES
Subjective:    Patient ID:  Shanta Sterling is a 53 y.o. female who presents for Hypertension (labs) and Valvular Heart Disease        HPI  DISCUSSED LABS AND GOALS, BMP OK, RECENT URI, OVERALL DOING WELL NO CHEST PAIN NO SIGNIFICANT SHORTNESS OF BREATH, WAS NOT ACTIVE WITH THE URI, MILD DISTANT EXERTION AND CHEST PRESSURE WITH EXERTION, NO SIGNIFICANT CHANGE, BLOOD PRESSURE AT THE UPPER AND, SEE ROS    Past Medical History:   Diagnosis Date    Acid reflux     Acute gastritis     Dry eyes     Dry mouth     Heart murmur     Hypertension     [facility]neum[facility] 2000    [facility]    Sicca syndrome, unspecified     Sjogren's disease     Valvular regurgitation      Past Surgical History:   Procedure Laterality Date    APPENDECTOMY  11/2009         BREAST SURGERY Bilateral 04/2004    Augmentation    FOOT NEUROMA SURGERY Left 1992    HYSTERECTOMY  2002     Family History   Problem Relation Age of Onset    Hypertension Mother     COPD Father     Diabetes Mellitus Father     Heart disease Father     Hypertension Father      Social History     Socioeconomic History    Marital status:      Spouse name: Not on file    Number of children: Not on file    Years of education: Not on file    Highest education level: Not on file   Occupational History    Not on file   Social Needs    Financial resource strain: Not on file    Food insecurity:     Worry: Not on file     Inability: Not on file    Transportation needs:     Medical: Not on file     Non-medical: Not on file   Tobacco Use    Smoking status: Never Smoker    Smokeless tobacco: Never Used   Substance and Sexual Activity    Alcohol use: Yes     Comment: one drink every deb 6 days/week    Drug use: No    Sexual activity: Not on file   Lifestyle    Physical activity:     Days per week: Not on file     Minutes per session: Not on file    Stress: Not on file   Relationships    Social connections:     Talks on phone: Not on file     Gets  together: Not on file     Attends Jewish service: Not on file     Active member of club or organization: Not on file     Attends meetings of clubs or organizations: Not on file     Relationship status: Not on file   Other Topics Concern    Not on file   Social History Narrative    Not on file       Review of patient's allergies indicates:   Allergen Reactions    Codeine Other (See Comments)     vomiting       Current Outpatient Medications:     calcium citrate-vitamin D3 315-200 mg (CITRACAL+D) 315-200 mg-unit per tablet, Take 1 tablet by mouth once daily., Disp: , Rfl:     carboxymethylcellulose sodium (THERA TEARS) 0.25 % Drop, 1 drop as needed., Disp: , Rfl:     cevimeline (EVOXAC) 30 mg capsule, Take 1 capsule (30 mg total) by mouth 2 (two) times daily., Disp: 180 capsule, Rfl: 3    coenzyme Q10 (CO Q-10) 200 mg capsule, Take 200 mg by mouth once daily., Disp: , Rfl:     estradiol (ESTRACE) 0.5 MG tablet, Take 0.5 mg by mouth once daily., Disp: , Rfl:     hydroxychloroquine (PLAQUENIL) 200 mg tablet, Take 1 tablet (200 mg total) by mouth 2 (two) times daily., Disp: 180 tablet, Rfl: 3    losartan (COZAAR) 25 MG tablet, TAKE 1 TABLET(25 MG) BY MOUTH EVERY EVENING, Disp: 90 tablet, Rfl: 2    multivitamin (MULTIVITAMIN) per tablet, Take 1 tablet by mouth once daily., Disp: , Rfl:     PROAIR RESPICLICK 90 mcg/actuation AePB, INHALE 2 PUFFS PO 30 MINUTES BEFORE EXERCISE, Disp: , Rfl: 5    amLODIPine (NORVASC) 5 MG tablet, Take 1 tablet (5 mg total) by mouth once daily., Disp: 90 tablet, Rfl: 2    ranitidine (ZANTAC) 150 MG tablet, Take 150 mg by mouth nightly., Disp: , Rfl:     Review of Systems   Constitution: Negative for chills, decreased appetite, diaphoresis, fever, malaise/fatigue and night sweats.   HENT: Negative for congestion and nosebleeds.    Eyes: Negative for blurred vision and visual disturbance.   Cardiovascular: Negative for chest pain, claudication, cyanosis, dyspnea on exertion  "(MINIMAL), irregular heartbeat, leg swelling, near-syncope, orthopnea, palpitations, paroxysmal nocturnal dyspnea and syncope.        STABLE  RAYNAUD'S SX   Respiratory: Negative for cough (BETTER), hemoptysis, shortness of breath and wheezing.    Endocrine: Negative for cold intolerance, heat intolerance and polyuria.   Hematologic/Lymphatic: Negative for adenopathy and bleeding problem. Does not bruise/bleed easily.   Skin: Negative for itching and rash.   Musculoskeletal: Negative for back pain and falls. Joint pain: MILD L SHOULDER.   Gastrointestinal: Negative for abdominal pain, change in bowel habit, dysphagia, heartburn, hematemesis, jaundice, melena and vomiting.   Genitourinary: Negative for dysuria, flank pain and frequency.   Neurological: Negative for brief paralysis, dizziness, focal weakness, light-headedness, loss of balance, numbness, tremors and weakness.   Psychiatric/Behavioral: Negative for depression and memory loss.   Allergic/Immunologic: Negative for hives and persistent infections.        Objective:      Vitals:    06/17/19 0807   BP: 139/83   Pulse: (!) 57   Weight: 72 kg (158 lb 11.7 oz)   Height: 5' 6" (1.676 m)   PainSc: 0-No pain     Body mass index is 25.62 kg/m².    Physical Exam   Constitutional: She is oriented to person, place, and time. She appears well-developed and well-nourished. She is active.   HENT:   Head: Normocephalic and atraumatic.   Mouth/Throat: Oropharynx is clear and moist and mucous membranes are normal.   Eyes: Pupils are equal, round, and reactive to light. Conjunctivae and EOM are normal.   Neck: Trachea normal and normal range of motion. Neck supple. Normal carotid pulses, no hepatojugular reflux and no JVD present. Carotid bruit is not present. No tracheal deviation, no edema and no erythema present. No thyromegaly present.   Cardiovascular: Normal rate and regular rhythm.  No extrasystoles are present. PMI is not displaced. Exam reveals no gallop and no " friction rub.   Murmur heard.  High-pitched blowing holosystolic murmur is present at the apex.  High-pitched blowing decrescendo early diastolic murmur is present at the upper right sternal border radiating to the apex.  Pulses:       Carotid pulses are 2+ on the right side, and 2+ on the left side.       Radial pulses are 2+ on the right side, and 2+ on the left side.        Femoral pulses are 2+ on the right side, and 2+ on the left side.       Popliteal pulses are 2+ on the right side, and 2+ on the left side.        Dorsalis pedis pulses are 2+ on the right side, and 2+ on the left side.        Posterior tibial pulses are 2+ on the right side, and 2+ on the left side.   CAPILLARY REFILL IN TOES, FINGERS OK   Pulmonary/Chest: Effort normal and breath sounds normal. No tachypnea and no bradypnea. She has no wheezes. She has no rales. She exhibits no tenderness.   Abdominal: Soft. Bowel sounds are normal. She exhibits no distension and no mass. There is no hepatosplenomegaly. There is no tenderness. There is no guarding and no CVA tenderness.   Musculoskeletal: Normal range of motion. She exhibits no edema or tenderness.   Lymphadenopathy:     She has no cervical adenopathy.   Neurological: She is alert and oriented to person, place, and time. She has normal strength. She displays no tremor. No cranial nerve deficit.   Skin: Skin is warm and dry. No rash noted. No cyanosis or erythema. No pallor.   Psychiatric: She has a normal mood and affect. Her speech is normal and behavior is normal.               ..    Chemistry        Component Value Date/Time     06/12/2019 0931    K 4.5 06/12/2019 0931     06/12/2019 0931    CO2 26 06/12/2019 0931    BUN 14 06/12/2019 0931    CREATININE 1.0 06/12/2019 0931    GLU 75 06/12/2019 0931        Component Value Date/Time    CALCIUM 9.3 06/12/2019 0931    ALKPHOS 55 10/27/2015 0921    AST 23 10/27/2015 0921    ALT 19 10/27/2015 0921    BILITOT 0.7 10/27/2015 0921     ESTGFRAFRICA >60.0 06/12/2019 0931    EGFRNONAA >60.0 06/12/2019 0931            ..No results found for: CHOL  No results found for: HDL  No results found for: LDLCALC  No results found for: TRIG  No results found for: CHOLHDL  ..  Lab Results   Component Value Date    WBC 5.97 10/27/2015    HGB 12.9 10/27/2015    HCT 38.4 10/27/2015    MCV 90 10/27/2015     10/27/2015       Test(s) Reviewed  I have reviewed the following in detail:  [] Stress test   [] Angiography   [] Echocardiogram   [x] Labs   [] Other:       Assessment:         ICD-10-CM ICD-9-CM   1. Essential hypertension I10 401.9   2. Mitral and aortic regurgitation I08.0 396.3   3. Raynaud's phenomenon without gangrene I73.00 443.0     Problem List Items Addressed This Visit        Cardiac/Vascular    Raynaud's phenomenon    Essential hypertension - Primary    Relevant Orders    Basic metabolic panel    Mitral and aortic regurgitation    Relevant Orders    Transthoracic echo (TTE) 2D with Color Flow           Plan:    INCREASE NORCVASC TO 5 MG FOR BETTER BLOOD PRESSURE CONTROL, REASSESS VALVULAR DISEASE,,ALL OTHER CV CLINICALLY STABLE, NO ANGINA, NO HF, NO TIA, NO CLINICAL ARRHYTHMIA,CONTINUE CURRENT MEDS, EDUCATION, DIET, EXERCISE, AVOID EXCESSIVE WEIGHTLIFTING, RETURN CLINIC IN 9 MONTHS WITH LABS      Essential hypertension  -     Basic metabolic panel; Future; Expected date: 06/17/2019    Mitral and aortic regurgitation  -     Transthoracic echo (TTE) 2D with Color Flow; Future    Raynaud's phenomenon without gangrene    Other orders  -     losartan (COZAAR) 25 MG tablet; TAKE 1 TABLET(25 MG) BY MOUTH EVERY EVENING  Dispense: 90 tablet; Refill: 2  -     Discontinue: amLODIPine (NORVASC) 2.5 MG tablet; Take 1 tablet (2.5 mg total) by mouth once daily.  Dispense: 90 tablet; Refill: 2  -     amLODIPine (NORVASC) 5 MG tablet; Take 1 tablet (5 mg total) by mouth once daily.  Dispense: 90 tablet; Refill: 2    RTC Low level/low impact aerobic exercise  5x's/wk. Heart healthy diet and risk factor modification.    See labs and med orders.    Aerobic exercise 5x's/wk. Heart healthy diet and risk factor modification.    See labs and med orders.

## 2019-09-16 PROBLEM — Z13.220 LIPID SCREENING: Status: RESOLVED | Noted: 2019-06-17 | Resolved: 2019-09-16

## 2020-01-04 NOTE — PROGRESS NOTES
"Subjective:       Patient ID: Shanta Sterling is a 54 y.o. female with Sjogren's syndrome.    Chief Complaint: No chief complaint on file.  She returns for follow up. 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    She was last seen on 4/2/19. She is still without complaints. Still on  mg qd with eye exams q 6 months and evoxac 2-3 xday. She could not tolerate restasis. Uses OTC eye drops.  Her dry mouth is controlled on evoxac and she sees the dentist 2 x/year.  She gets Raynaud's but it is controlled on amlodipine. She has no ischemic changes, no digital scars, loss of pulp etc.  She has very minimal AM stiffness & denies joint pain or swelling.  L shoulder is much improved.   Denies dysphagia, tight skin, alopecia, pleurisy, pericarditis, photosensitivity, skin rashes.  Has had some oral ulcers in the past. May also have had reactive airway disease. Still taking CoQ 100 mg which has helped myalgias (in past) considerably.      She is taking OTC vitamin D3 and taking her calcium through the diet.     She is followed by Dr. Jefferson Rivers in Cardiology.  (Cardiology has documented heart murmurs and dx MR and AR: " High-pitched blowing holosystolic murmur is present  at the apex; High-pitched blowing decrescendo early diastolic murmur is present  at the upper right sternal border radiating to the apex" ).     Her main issue is brain fog.       Current Outpatient Medications   Medication Sig Dispense Refill    amLODIPine (NORVASC) 5 MG tablet Take 1 tablet (5 mg total) by mouth once daily. 90 tablet 2    carboxymethylcellulose sodium (THERA TEARS) 0.25 % Drop 1 drop as needed.      cevimeline (EVOXAC) 30 mg capsule Take 1 capsule (30 mg total) by mouth 2 (two) times daily. 180 capsule 3    coenzyme Q10 (CO Q-10) 200 mg capsule Take 200 mg by mouth once daily.      estradiol (ESTRACE) 0.5 MG tablet Take 0.5 mg by mouth once daily. "      hydroxychloroquine (PLAQUENIL) 200 mg tablet Take 1 tablet (200 mg total) by mouth 2 (two) times daily. 180 tablet 3    losartan (COZAAR) 25 MG tablet TAKE 1 TABLET(25 MG) BY MOUTH EVERY EVENING 90 tablet 2    multivitamin (MULTIVITAMIN) per tablet Take 1 tablet by mouth once daily.      PROAIR RESPICLICK 90 mcg/actuation AePB INHALE 2 PUFFS PO 30 MINUTES BEFORE EXERCISE  5    calcium citrate-vitamin D3 315-200 mg (CITRACAL+D) 315-200 mg-unit per tablet Take 1 tablet by mouth once daily.      ranitidine (ZANTAC) 150 MG tablet Take 150 mg by mouth nightly.       No current facility-administered medications for this visit.        Not taking calcium citrate: takes calcium through the diet.         Allergies   Allergen Reactions    Codeine Other (See Comments)     vomiting     Past Medical History:   Diagnosis Date    Acid reflux     Acute gastritis     Dry eyes     Dry mouth     Heart murmur     Hypertension     [facility]    Sicca syndrome, unspecified     Sjogren's disease     Valvular regurgitation      Past Surgical History:   Procedure Laterality Date    APPENDECTOMY  11/2009         BREAST SURGERY Bilateral 04/2004    Augmentation    FOOT NEUROMA SURGERY Left 1992    HYSTERECTOMY  2002         Review of Systems   Constitutional: Positive for diaphoresis. Negative for fatigue, fever and unexpected weight change.   HENT: Negative.  Negative for sore throat, tinnitus and trouble swallowing.         Dry mouth   Eyes: Negative.  Negative for redness and visual disturbance.        Dry eyes   Respiratory: Negative.  Negative for cough, choking, chest tightness and shortness of breath.    Cardiovascular: Negative.  Negative for chest pain, palpitations and leg swelling.   Gastrointestinal: Negative.  Negative for abdominal distention, abdominal pain, blood in stool, constipation, diarrhea, nausea and vomiting.        Heartburn   Endocrine: Negative.    Genitourinary:  "Negative.  Negative for dysuria, frequency, genital sores, hematuria and menstrual problem.   Musculoskeletal: Negative.  Negative for back pain, joint swelling, myalgias, neck pain and neck stiffness.   Skin: Negative.  Negative for rash.   Allergic/Immunologic: Negative.    Neurological: Negative.  Negative for dizziness, syncope, weakness, light-headedness, numbness and headaches.   Hematological: Negative.  Negative for adenopathy. Does not bruise/bleed easily.   Psychiatric/Behavioral: Positive for sleep disturbance. Negative for dysphoric mood. The patient is nervous/anxious.          Family History   Problem Relation Age of Onset    Hypertension Mother     COPD Father     Diabetes Mellitus Father     Heart disease Father     Hypertension Father        SH: non smoker; social drinker.  She has a new job as  at [employer] in [place].  Very active; tries to run 3 x/week.     Objective:   /79   Pulse 69   Ht 5' 6" (1.676 m)   Wt 71.2 kg (156 lb 15.5 oz)   BMI 25.34 kg/m²     Physical Exam   Vitals reviewed.  Constitutional: She is oriented to person, place, and time and well-developed, well-nourished, and in no distress. No distress.   HENT:   Head: Normocephalic and atraumatic.   Mouth/Throat: Oropharynx is clear and moist. No oropharyngeal exudate.   No facial rashes  Parotids not enlarged  No oral ulcers  Adequate moisture  Teeth in excellent repair.   Eyes: Conjunctivae and EOM are normal. Pupils are equal, round, and reactive to light. Right eye exhibits no discharge. Left eye exhibits no discharge. No scleral icterus.   Neck: Neck supple. No JVD present. No tracheal deviation present. No thyromegaly present.   Cardiovascular: Normal rate, regular rhythm, normal heart sounds and intact distal pulses.  Exam reveals no gallop and no friction rub.    No murmur heard.  Murmurs reported by cardiologist: "High-pitched blowing holosystolic murmur is present  at the " "apex  High-pitched blowing decrescendo early diastolic murmur is present  at the upper right sternal border radiating to the apex"   Pulmonary/Chest: Effort normal and breath sounds normal. No respiratory distress. She has no wheezes. She has no rales. She exhibits no tenderness.   Abdominal: Soft. Bowel sounds are normal. She exhibits no distension and no mass. There is no splenomegaly or hepatomegaly. There is no tenderness. There is no rebound and no guarding.   Lymphadenopathy:     She has no cervical adenopathy.   Neurological: She is alert and oriented to person, place, and time. She has normal reflexes. No cranial nerve deficit. Gait normal.   Proximal and distal muscle strength 5/5.   Skin: Skin is warm and dry. No rash noted. She is not diaphoretic.     Psychiatric: Mood, memory, affect and judgment normal.   Musculoskeletal: Normal range of motion. She exhibits no edema or tenderness.   Cspine FROM no tenderness  Tspine FROM no tenderness  Lspine FROM no tenderness.  TMJ: unremarkable  Shoulders: FROM now on L; non tender; no synovitis; R ok;  Elbows: FROM; no synovitis; no tophi or nodules  Wrists: FROM; no synovitis;   MCPs: Minimal squaring of 1st C_MCP joints bilaterally. FROM; no synovitis; no metacarpalgia;  ok;  PIPs:FROM; no synovitis;   DIPs: FROM; no synovitis;   HIPS: FROM  Knees: FROM; no synovitis; no instability;  Ankles: FROM: no synovitis   Toes: ok; no metatarsalgia.             LABS: 19: BMP ok;   18: BMP: cnne 1.0;   3/13/18: + MARIA DOLORES +SSA; rest of pro neg; UA ok;   18: TFTs ok;   18: BMP cnne 1.1 GFR 57.5; BUN 21  10/27/15: ESR 3: CRP 2.2; CBC, CMP ok;   14: ESR 2; CRP 0.7; CBC ok; alb. 3.4;   13: ESR 10; CRP 0.7; CBC ok cnne 1.1; GFR 53;   MARIA DOLORES 1:160sp; +SSA; +SSB; +RF (53);   Neg or wnl: CCP, C3, C4, Scl-70; UA; LAC; aCLA; Dcrp6cml  Vit D 56     Imagin13: Arthritis survey personally reviewed: unremarkable     Assessment:   Sjogren's syndrome " since 7/11 on HCQ since 7/11--doing very well.   On  mg/d & evoxac 30 mg bid   +MARIA DOLORES, +SSA, +SSB, +RF    with dry eyes (intolerant to restasis),    dry mouth (on evoxac, intolerant to pilocarpine)    associated with polyarthritis (pain & swelling: hands, elbows, shoulders, knees & feet)     Feels CoQ 10   Occasional oral ulcers & upper dysphagia.    Raynaud's  & AM stiffness     RP helped by amlodipine 2.5 mg/d   mother with UCTD     L adhesive capsulitis with calcification head of humerus--much improved   Responsive to injection & some PT     Menopausal   S/P hysterectomy wo BSO   + vasomotor sxs-night sweats   On estradiol    Mitral and Aortic Insufficiency as per cardiology    Essential hypertension    Renal insufficiency precipitated by Aleve in past.          Plan:   Continue  mg/day + evoxac 30 mg bid-tid.  Continue Eye exams q 6-12 months  Continue Vitamin D 5,000 IU once a week.  Calcium through diet as discussed.  Daily, low impact aerobic exercise.  Check w cardiologist about including 1 minute of hi intensity exercise.   RTC 6 months or prn.

## 2020-01-08 ENCOUNTER — OFFICE VISIT (OUTPATIENT)
Dept: RHEUMATOLOGY | Facility: CLINIC | Age: 55
End: 2020-01-08
Payer: COMMERCIAL

## 2020-01-08 VITALS
SYSTOLIC BLOOD PRESSURE: 127 MMHG | DIASTOLIC BLOOD PRESSURE: 79 MMHG | WEIGHT: 156.94 LBS | HEART RATE: 69 BPM | BODY MASS INDEX: 25.22 KG/M2 | HEIGHT: 66 IN

## 2020-01-08 DIAGNOSIS — I73.00 RAYNAUD'S PHENOMENON WITHOUT GANGRENE: ICD-10-CM

## 2020-01-08 DIAGNOSIS — Z79.899 LONG-TERM USE OF HYDROXYCHLOROQUINE: ICD-10-CM

## 2020-01-08 DIAGNOSIS — M35.00 SJOGREN'S SYNDROME, WITH UNSPECIFIED ORGAN INVOLVEMENT: Primary | ICD-10-CM

## 2020-01-08 DIAGNOSIS — M75.02 ADHESIVE CAPSULITIS OF LEFT SHOULDER: ICD-10-CM

## 2020-01-08 PROCEDURE — 3078F PR MOST RECENT DIASTOLIC BLOOD PRESSURE < 80 MM HG: ICD-10-PCS | Mod: CPTII,S$GLB,, | Performed by: INTERNAL MEDICINE

## 2020-01-08 PROCEDURE — 99214 OFFICE O/P EST MOD 30 MIN: CPT | Mod: S$GLB,,, | Performed by: INTERNAL MEDICINE

## 2020-01-08 PROCEDURE — 99214 PR OFFICE/OUTPT VISIT, EST, LEVL IV, 30-39 MIN: ICD-10-PCS | Mod: S$GLB,,, | Performed by: INTERNAL MEDICINE

## 2020-01-08 PROCEDURE — 3008F BODY MASS INDEX DOCD: CPT | Mod: CPTII,S$GLB,, | Performed by: INTERNAL MEDICINE

## 2020-01-08 PROCEDURE — 99999 PR PBB SHADOW E&M-EST. PATIENT-LVL III: CPT | Mod: PBBFAC,,, | Performed by: INTERNAL MEDICINE

## 2020-01-08 PROCEDURE — 3074F PR MOST RECENT SYSTOLIC BLOOD PRESSURE < 130 MM HG: ICD-10-PCS | Mod: CPTII,S$GLB,, | Performed by: INTERNAL MEDICINE

## 2020-01-08 PROCEDURE — 3078F DIAST BP <80 MM HG: CPT | Mod: CPTII,S$GLB,, | Performed by: INTERNAL MEDICINE

## 2020-01-08 PROCEDURE — 99999 PR PBB SHADOW E&M-EST. PATIENT-LVL III: ICD-10-PCS | Mod: PBBFAC,,, | Performed by: INTERNAL MEDICINE

## 2020-01-08 PROCEDURE — 3008F PR BODY MASS INDEX (BMI) DOCUMENTED: ICD-10-PCS | Mod: CPTII,S$GLB,, | Performed by: INTERNAL MEDICINE

## 2020-01-08 PROCEDURE — 3074F SYST BP LT 130 MM HG: CPT | Mod: CPTII,S$GLB,, | Performed by: INTERNAL MEDICINE

## 2020-01-08 RX ORDER — HYDROXYCHLOROQUINE SULFATE 200 MG/1
200 TABLET, FILM COATED ORAL 2 TIMES DAILY
Qty: 180 TABLET | Refills: 3 | Status: SHIPPED | OUTPATIENT
Start: 2020-01-08 | End: 2021-03-09

## 2020-01-08 RX ORDER — CEVIMELINE HYDROCHLORIDE 30 MG/1
30 CAPSULE ORAL 3 TIMES DAILY
Qty: 270 CAPSULE | Refills: 3 | Status: SHIPPED | OUTPATIENT
Start: 2020-01-08 | End: 2020-04-08

## 2020-01-08 ASSESSMENT — ROUTINE ASSESSMENT OF PATIENT INDEX DATA (RAPID3)
FATIGUE SCORE: 0
PAIN SCORE: 0
AM STIFFNESS SCORE: 1, YES
TOTAL RAPID3 SCORE: 0
MDHAQ FUNCTION SCORE: 0
PSYCHOLOGICAL DISTRESS SCORE: 2.2
PATIENT GLOBAL ASSESSMENT SCORE: 0

## 2020-01-08 NOTE — PROGRESS NOTES
Rapid3 Question Responses and Scores 1/8/2020   MDHAQ Score 0   Psychologic Score 2.2   Pain Score 0   When you awakened in the morning OVER THE LAST WEEK, did you feel stiff? Yes   If Yes, please indicate the number of hours until you are as limber as you will be for the day 0.1   Fatigue Score 0   Global Health Score 0   RAPID3 Score 0

## 2020-01-23 ENCOUNTER — OFFICE VISIT (OUTPATIENT)
Dept: CARDIOLOGY | Facility: CLINIC | Age: 55
End: 2020-01-23
Payer: COMMERCIAL

## 2020-01-23 VITALS
HEIGHT: 66 IN | DIASTOLIC BLOOD PRESSURE: 78 MMHG | WEIGHT: 155.63 LBS | HEART RATE: 68 BPM | SYSTOLIC BLOOD PRESSURE: 132 MMHG | BODY MASS INDEX: 25.01 KG/M2

## 2020-01-23 DIAGNOSIS — R07.2 PRECORDIAL PAIN: Primary | ICD-10-CM

## 2020-01-23 DIAGNOSIS — I73.00 RAYNAUD'S PHENOMENON WITHOUT GANGRENE: ICD-10-CM

## 2020-01-23 DIAGNOSIS — I10 ESSENTIAL HYPERTENSION: ICD-10-CM

## 2020-01-23 DIAGNOSIS — Z91.89 AT RISK FOR CORONARY ARTERY DISEASE: ICD-10-CM

## 2020-01-23 DIAGNOSIS — Z13.220 LIPID SCREENING: ICD-10-CM

## 2020-01-23 PROCEDURE — 3008F PR BODY MASS INDEX (BMI) DOCUMENTED: ICD-10-PCS | Mod: CPTII,S$GLB,, | Performed by: INTERNAL MEDICINE

## 2020-01-23 PROCEDURE — 3075F PR MOST RECENT SYSTOLIC BLOOD PRESS GE 130-139MM HG: ICD-10-PCS | Mod: CPTII,S$GLB,, | Performed by: INTERNAL MEDICINE

## 2020-01-23 PROCEDURE — 3008F BODY MASS INDEX DOCD: CPT | Mod: CPTII,S$GLB,, | Performed by: INTERNAL MEDICINE

## 2020-01-23 PROCEDURE — 99999 PR PBB SHADOW E&M-EST. PATIENT-LVL III: ICD-10-PCS | Mod: PBBFAC,,, | Performed by: INTERNAL MEDICINE

## 2020-01-23 PROCEDURE — 3075F SYST BP GE 130 - 139MM HG: CPT | Mod: CPTII,S$GLB,, | Performed by: INTERNAL MEDICINE

## 2020-01-23 PROCEDURE — 99214 PR OFFICE/OUTPT VISIT, EST, LEVL IV, 30-39 MIN: ICD-10-PCS | Mod: S$GLB,,, | Performed by: INTERNAL MEDICINE

## 2020-01-23 PROCEDURE — 93000 EKG 12-LEAD: ICD-10-PCS | Mod: S$GLB,,, | Performed by: INTERNAL MEDICINE

## 2020-01-23 PROCEDURE — 3078F DIAST BP <80 MM HG: CPT | Mod: CPTII,S$GLB,, | Performed by: INTERNAL MEDICINE

## 2020-01-23 PROCEDURE — 99214 OFFICE O/P EST MOD 30 MIN: CPT | Mod: S$GLB,,, | Performed by: INTERNAL MEDICINE

## 2020-01-23 PROCEDURE — 93000 ELECTROCARDIOGRAM COMPLETE: CPT | Mod: S$GLB,,, | Performed by: INTERNAL MEDICINE

## 2020-01-23 PROCEDURE — 99999 PR PBB SHADOW E&M-EST. PATIENT-LVL III: CPT | Mod: PBBFAC,,, | Performed by: INTERNAL MEDICINE

## 2020-01-23 PROCEDURE — 3078F PR MOST RECENT DIASTOLIC BLOOD PRESSURE < 80 MM HG: ICD-10-PCS | Mod: CPTII,S$GLB,, | Performed by: INTERNAL MEDICINE

## 2020-01-23 RX ORDER — OMEPRAZOLE 40 MG/1
40 CAPSULE, DELAYED RELEASE ORAL DAILY
COMMUNITY
End: 2021-05-31

## 2020-01-23 RX ORDER — SUCRALFATE 1 G/1
1 TABLET ORAL 4 TIMES DAILY
Qty: 120 TABLET | Refills: 1 | Status: SHIPPED | OUTPATIENT
Start: 2020-01-23 | End: 2020-02-17

## 2020-01-23 RX ORDER — NITROGLYCERIN 0.4 MG/1
0.4 TABLET SUBLINGUAL EVERY 5 MIN PRN
Qty: 25 TABLET | Refills: 0 | Status: SHIPPED | OUTPATIENT
Start: 2020-01-23 | End: 2020-02-11

## 2020-01-23 NOTE — PROGRESS NOTES
Subjective:    Patient ID:  Shanta Sterling is a 54 y.o. female who presents for Hypertension and Chest Pain        HPI  REQUESTED OV, CP, LAST ECHO MILD AI, TRACE MR, HAS BEEN HAVING CP FEELS LIKE INDIGESTION ON AND OFF FOR 2 MO, SOMETIMES SEVERE, MID SSCP, TOOK PRILOSEC AND TUMS, , AT TIMES WORSE WITH DEEP BREATHING, OCC THROUGH TO BACK, NON EXERTIONAL, ?? AFTER STRESS OCC AFTER TREADMILL, NO RELATION TO FOOD,FATHER PASSED AWAY RECENTLY,  SEE ROS    Past Medical History:   Diagnosis Date    Acid reflux     Acute gastritis     Dry eyes     Dry mouth     Heart murmur     Hypertension     [facility] 2000    [facility]    Sicca syndrome, unspecified     Sjogren's disease     Valvular regurgitation      Past Surgical History:   Procedure Laterality Date    APPENDECTOMY  11/2009         BREAST SURGERY Bilateral 04/2004    Augmentation    FOOT NEUROMA SURGERY Left 1992    HYSTERECTOMY  2002     Family History   Problem Relation Age of Onset    Hypertension Mother     COPD Father     Diabetes Mellitus Father     Heart disease Father     Hypertension Father      Social History     Socioeconomic History    Marital status:      Spouse name: Not on file    Number of children: Not on file    Years of education: Not on file    Highest education level: Not on file   Occupational History    Not on file   Social Needs    Financial resource strain: Not on file    Food insecurity:     Worry: Not on file     Inability: Not on file    Transportation needs:     Medical: Not on file     Non-medical: Not on file   Tobacco Use    Smoking status: Never Smoker    Smokeless tobacco: Never Used   Substance and Sexual Activity    Alcohol use: Yes     Comment: one drink every deb 6 days/week    Drug use: No    Sexual activity: Not on file   Lifestyle    Physical activity:     Days per week: Not on file     Minutes per session: Not on file    Stress: Not on file   Relationships    Social  connections:     Talks on phone: Not on file     Gets together: Not on file     Attends Caodaism service: Not on file     Active member of club or organization: Not on file     Attends meetings of clubs or organizations: Not on file     Relationship status: Not on file   Other Topics Concern    Not on file   Social History Narrative    Not on file       Review of patient's allergies indicates:   Allergen Reactions    Codeine Other (See Comments)     vomiting       Current Outpatient Medications:     amLODIPine (NORVASC) 5 MG tablet, Take 1 tablet (5 mg total) by mouth once daily., Disp: 90 tablet, Rfl: 2    calcium citrate-vitamin D3 315-200 mg (CITRACAL+D) 315-200 mg-unit per tablet, Take 1 tablet by mouth once daily., Disp: , Rfl:     carboxymethylcellulose sodium (THERA TEARS) 0.25 % Drop, 1 drop as needed., Disp: , Rfl:     cevimeline (EVOXAC) 30 mg capsule, Take 1 capsule (30 mg total) by mouth 3 (three) times daily., Disp: 270 capsule, Rfl: 3    coenzyme Q10 (CO Q-10) 200 mg capsule, Take 200 mg by mouth once daily., Disp: , Rfl:     estradiol (ESTRACE) 0.5 MG tablet, Take 0.5 mg by mouth once daily., Disp: , Rfl:     hydroxychloroquine (PLAQUENIL) 200 mg tablet, Take 1 tablet (200 mg total) by mouth 2 (two) times daily., Disp: 180 tablet, Rfl: 3    losartan (COZAAR) 25 MG tablet, TAKE 1 TABLET(25 MG) BY MOUTH EVERY EVENING, Disp: 90 tablet, Rfl: 2    multivitamin (MULTIVITAMIN) per tablet, Take 1 tablet by mouth once daily., Disp: , Rfl:     omeprazole (PRILOSEC) 40 MG capsule, Take 40 mg by mouth once daily., Disp: , Rfl:     PROAIR RESPICLICK 90 mcg/actuation AePB, INHALE 2 PUFFS PO 30 MINUTES BEFORE EXERCISE, Disp: , Rfl: 5    nitroGLYCERIN (NITROSTAT) 0.4 MG SL tablet, Place 1 tablet (0.4 mg total) under the tongue every 5 (five) minutes as needed., Disp: 25 tablet, Rfl: 0    sucralfate (CARAFATE) 1 gram tablet, Take 1 tablet (1 g total) by mouth 4 (four) times daily., Disp: 120 tablet,  "Rfl: 1    Review of Systems   Constitution: Negative for chills, decreased appetite, diaphoresis, fever, malaise/fatigue and night sweats.   HENT: Negative for congestion and nosebleeds.    Eyes: Negative for blurred vision and visual disturbance.   Cardiovascular: Positive for chest pain. Negative for claudication, cyanosis, dyspnea on exertion (MINIMAL), irregular heartbeat, leg swelling, near-syncope, orthopnea, palpitations, paroxysmal nocturnal dyspnea and syncope.        STABLE  RAYNAUD'S SX   Respiratory: Negative for cough (BETTER), hemoptysis, shortness of breath, sleep disturbances due to breathing and wheezing.    Endocrine: Negative for cold intolerance, heat intolerance and polyuria.   Hematologic/Lymphatic: Negative for adenopathy and bleeding problem. Does not bruise/bleed easily.   Skin: Negative for itching and rash.   Musculoskeletal: Negative for back pain and falls. Joint pain: MILD L SHOULDER.   Gastrointestinal: Negative for abdominal pain, change in bowel habit, dysphagia, heartburn (INDIGESTION), hematemesis, jaundice, melena and vomiting.   Genitourinary: Negative for dysuria, flank pain and frequency.   Neurological: Negative for brief paralysis, dizziness, focal weakness, light-headedness, loss of balance, tremors and weakness.   Psychiatric/Behavioral: Negative for depression and memory loss.   Allergic/Immunologic: Negative for hives and persistent infections.        Objective:      Vitals:    01/23/20 1142   BP: 132/78   Pulse: 68   Weight: 70.6 kg (155 lb 10.3 oz)   Height: 5' 6" (1.676 m)   PainSc:   2     Body mass index is 25.12 kg/m².    Physical Exam   Constitutional: She is oriented to person, place, and time. She appears well-developed and well-nourished. She is active.   HENT:   Head: Normocephalic and atraumatic.   Mouth/Throat: Oropharynx is clear and moist and mucous membranes are normal.   Eyes: Pupils are equal, round, and reactive to light. Conjunctivae and EOM are " normal.   Neck: Trachea normal and normal range of motion. Neck supple. Normal carotid pulses, no hepatojugular reflux and no JVD present. Carotid bruit is not present. No tracheal deviation, no edema and no erythema present. No thyromegaly present.   Cardiovascular: Normal rate and regular rhythm.  No extrasystoles are present. PMI is not displaced. Exam reveals no gallop and no friction rub.   Murmur heard.  High-pitched blowing holosystolic murmur is present at the apex.  High-pitched blowing decrescendo early diastolic murmur is present at the upper right sternal border radiating to the apex.  Pulses:       Carotid pulses are 2+ on the right side, and 2+ on the left side.       Radial pulses are 2+ on the right side, and 2+ on the left side.        Femoral pulses are 2+ on the right side, and 2+ on the left side.       Popliteal pulses are 2+ on the right side, and 2+ on the left side.        Dorsalis pedis pulses are 2+ on the right side, and 2+ on the left side.        Posterior tibial pulses are 2+ on the right side, and 2+ on the left side.   CAPILLARY REFILL IN TOES, FINGERS OK   Pulmonary/Chest: Effort normal and breath sounds normal. No tachypnea and no bradypnea. She has no wheezes. She has no rales. She exhibits no tenderness.   Abdominal: Soft. Bowel sounds are normal. She exhibits no distension and no mass. There is no hepatosplenomegaly. There is no tenderness. There is no guarding and no CVA tenderness.   Musculoskeletal: Normal range of motion. She exhibits no edema or tenderness.   Lymphadenopathy:     She has no cervical adenopathy.   Neurological: She is alert and oriented to person, place, and time. She has normal strength. She displays no tremor. No cranial nerve deficit.   Skin: Skin is warm and dry. No rash noted. No cyanosis or erythema. No pallor.   Psychiatric: She has a normal mood and affect. Her speech is normal and behavior is normal.               ..    Chemistry        Component  Value Date/Time     06/12/2019 0931    K 4.5 06/12/2019 0931     06/12/2019 0931    CO2 26 06/12/2019 0931    BUN 14 06/12/2019 0931    CREATININE 1.0 06/12/2019 0931    GLU 75 06/12/2019 0931        Component Value Date/Time    CALCIUM 9.3 06/12/2019 0931    ALKPHOS 55 10/27/2015 0921    AST 23 10/27/2015 0921    ALT 19 10/27/2015 0921    BILITOT 0.7 10/27/2015 0921    ESTGFRAFRICA >60.0 06/12/2019 0931    EGFRNONAA >60.0 06/12/2019 0931            ..No results found for: CHOL  No results found for: HDL  No results found for: LDLCALC  No results found for: TRIG  No results found for: CHOLHDL  ..  Lab Results   Component Value Date    WBC 5.97 10/27/2015    HGB 12.9 10/27/2015    HCT 38.4 10/27/2015    MCV 90 10/27/2015     10/27/2015       Test(s) Reviewed  I have reviewed the following in detail:  [] Stress test   [] Angiography   [x] Echocardiogram   [] Labs   [] Other:       Assessment:         ICD-10-CM ICD-9-CM   1. Precordial pain R07.2 786.51   2. Essential hypertension I10 401.9   3. Raynaud's phenomenon without gangrene I73.00 443.0   4. At risk for coronary artery disease Z91.89 V49.89   5. Lipid screening Z13.220 V77.91     Problem List Items Addressed This Visit        Cardiac/Vascular    Raynaud's phenomenon    Relevant Orders    Comprehensive metabolic panel    Stress Echo Which stress agent will be used? Treadmill Exercise; Color Flow Doppler? Yes    Essential hypertension    Relevant Orders    Comprehensive metabolic panel    Stress Echo Which stress agent will be used? Treadmill Exercise; Color Flow Doppler? Yes    Lipid screening    Relevant Orders    Comprehensive metabolic panel    Lipid panel    At risk for coronary artery disease    Relevant Orders    Comprehensive metabolic panel    Stress Echo Which stress agent will be used? Treadmill Exercise; Color Flow Doppler? Yes    CT Cardiac Scoring       Other    Precordial pain - Primary    Relevant Orders    SCHEDULED EKG  12-LEAD (to Muse) (Completed)    Comprehensive metabolic panel    Stress Echo Which stress agent will be used? Treadmill Exercise; Color Flow Doppler? Yes           Plan:     EKG SR, BORDERLINE QTC, WILL NEED EVALUATION STRESS ECHO, CA SCORE, CONSIDER GB ULTRASOUND, PRN SL NTG, START CARAFATE, SEE PCP,ALL OTHER CV CLINICALLY STABLE, NO CLEAR ANGINA, NO HF, NO TIA, NO CLINICAL ARRHYTHMIA,CONTINUE CURRENT MEDS, EDUCATION, DIET, EXERCISE, RETURN CLINIC IN FEW WEEKS AFTER TESTING      Precordial pain  -     SCHEDULED EKG 12-LEAD (to Muse); Future  -     Comprehensive metabolic panel; Future; Expected date: 01/23/2020  -     Stress Echo Which stress agent will be used? Treadmill Exercise; Color Flow Doppler? Yes; Future    Essential hypertension  -     Comprehensive metabolic panel; Future; Expected date: 01/23/2020  -     Stress Echo Which stress agent will be used? Treadmill Exercise; Color Flow Doppler? Yes; Future    Raynaud's phenomenon without gangrene  Comments:  STABLE  Orders:  -     Comprehensive metabolic panel; Future; Expected date: 01/23/2020  -     Stress Echo Which stress agent will be used? Treadmill Exercise; Color Flow Doppler? Yes; Future    At risk for coronary artery disease  -     Comprehensive metabolic panel; Future; Expected date: 01/23/2020  -     Stress Echo Which stress agent will be used? Treadmill Exercise; Color Flow Doppler? Yes; Future  -     CT Cardiac Scoring; Future; Expected date: 01/23/2020    Lipid screening  -     Comprehensive metabolic panel; Future; Expected date: 01/23/2020  -     Lipid panel; Future; Expected date: 01/23/2020    Other orders  -     nitroGLYCERIN (NITROSTAT) 0.4 MG SL tablet; Place 1 tablet (0.4 mg total) under the tongue every 5 (five) minutes as needed.  Dispense: 25 tablet; Refill: 0  -     sucralfate (CARAFATE) 1 gram tablet; Take 1 tablet (1 g total) by mouth 4 (four) times daily.  Dispense: 120 tablet; Refill: 1    RTC Low level/low impact aerobic  exercise 5x's/wk. Heart healthy diet and risk factor modification.    See labs and med orders.    Aerobic exercise 5x's/wk. Heart healthy diet and risk factor modification.    See labs and med orders.

## 2020-02-11 RX ORDER — NITROGLYCERIN 0.4 MG/1
TABLET SUBLINGUAL
Qty: 25 TABLET | Refills: 0 | Status: SHIPPED | OUTPATIENT
Start: 2020-02-11 | End: 2021-05-31

## 2020-02-17 RX ORDER — SUCRALFATE 1 G/1
1 TABLET ORAL 4 TIMES DAILY
Qty: 120 TABLET | Refills: 1 | Status: SHIPPED | OUTPATIENT
Start: 2020-02-17 | End: 2020-03-11

## 2020-03-10 RX ORDER — AMLODIPINE BESYLATE 5 MG/1
TABLET ORAL
Qty: 90 TABLET | Refills: 2 | Status: SHIPPED | OUTPATIENT
Start: 2020-03-10 | End: 2020-12-17

## 2020-03-11 RX ORDER — SUCRALFATE 1 G/1
1 TABLET ORAL 4 TIMES DAILY
Qty: 120 TABLET | Refills: 1 | Status: SHIPPED | OUTPATIENT
Start: 2020-03-11 | End: 2021-05-31

## 2020-03-19 ENCOUNTER — PATIENT MESSAGE (OUTPATIENT)
Dept: CARDIOLOGY | Facility: CLINIC | Age: 55
End: 2020-03-19

## 2020-04-08 DIAGNOSIS — M35.00 SJOGREN'S SYNDROME, WITH UNSPECIFIED ORGAN INVOLVEMENT: ICD-10-CM

## 2020-04-08 RX ORDER — CEVIMELINE HYDROCHLORIDE 30 MG/1
CAPSULE ORAL
Qty: 180 CAPSULE | Refills: 3 | Status: SHIPPED | OUTPATIENT
Start: 2020-04-08 | End: 2021-01-04

## 2020-04-27 PROBLEM — Z13.220 LIPID SCREENING: Status: RESOLVED | Noted: 2019-06-17 | Resolved: 2020-04-27

## 2020-05-22 ENCOUNTER — PATIENT MESSAGE (OUTPATIENT)
Dept: RHEUMATOLOGY | Facility: CLINIC | Age: 55
End: 2020-05-22

## 2020-07-10 ENCOUNTER — OFFICE VISIT (OUTPATIENT)
Dept: CARDIOLOGY | Facility: CLINIC | Age: 55
End: 2020-07-10
Payer: COMMERCIAL

## 2020-07-10 VITALS
BODY MASS INDEX: 23.88 KG/M2 | WEIGHT: 148.56 LBS | DIASTOLIC BLOOD PRESSURE: 83 MMHG | HEART RATE: 69 BPM | SYSTOLIC BLOOD PRESSURE: 127 MMHG | HEIGHT: 66 IN

## 2020-07-10 DIAGNOSIS — R93.1 AGATSTON CAC SCORE, <100: ICD-10-CM

## 2020-07-10 DIAGNOSIS — I73.00 RAYNAUD'S PHENOMENON WITHOUT GANGRENE: ICD-10-CM

## 2020-07-10 DIAGNOSIS — I35.1 NONRHEUMATIC AORTIC VALVE INSUFFICIENCY: ICD-10-CM

## 2020-07-10 DIAGNOSIS — I10 ESSENTIAL HYPERTENSION: Primary | ICD-10-CM

## 2020-07-10 PROBLEM — Z91.89 AT RISK FOR CORONARY ARTERY DISEASE: Status: RESOLVED | Noted: 2020-01-23 | Resolved: 2020-07-10

## 2020-07-10 PROCEDURE — 3079F PR MOST RECENT DIASTOLIC BLOOD PRESSURE 80-89 MM HG: ICD-10-PCS | Mod: CPTII,S$GLB,, | Performed by: INTERNAL MEDICINE

## 2020-07-10 PROCEDURE — 3008F PR BODY MASS INDEX (BMI) DOCUMENTED: ICD-10-PCS | Mod: CPTII,S$GLB,, | Performed by: INTERNAL MEDICINE

## 2020-07-10 PROCEDURE — 3074F SYST BP LT 130 MM HG: CPT | Mod: CPTII,S$GLB,, | Performed by: INTERNAL MEDICINE

## 2020-07-10 PROCEDURE — 3008F BODY MASS INDEX DOCD: CPT | Mod: CPTII,S$GLB,, | Performed by: INTERNAL MEDICINE

## 2020-07-10 PROCEDURE — 3074F PR MOST RECENT SYSTOLIC BLOOD PRESSURE < 130 MM HG: ICD-10-PCS | Mod: CPTII,S$GLB,, | Performed by: INTERNAL MEDICINE

## 2020-07-10 PROCEDURE — 99999 PR PBB SHADOW E&M-EST. PATIENT-LVL III: ICD-10-PCS | Mod: PBBFAC,,, | Performed by: INTERNAL MEDICINE

## 2020-07-10 PROCEDURE — 3079F DIAST BP 80-89 MM HG: CPT | Mod: CPTII,S$GLB,, | Performed by: INTERNAL MEDICINE

## 2020-07-10 PROCEDURE — 99999 PR PBB SHADOW E&M-EST. PATIENT-LVL III: CPT | Mod: PBBFAC,,, | Performed by: INTERNAL MEDICINE

## 2020-07-10 PROCEDURE — 99213 PR OFFICE/OUTPT VISIT, EST, LEVL III, 20-29 MIN: ICD-10-PCS | Mod: S$GLB,,, | Performed by: INTERNAL MEDICINE

## 2020-07-10 PROCEDURE — 99213 OFFICE O/P EST LOW 20 MIN: CPT | Mod: S$GLB,,, | Performed by: INTERNAL MEDICINE

## 2020-07-10 NOTE — PROGRESS NOTES
Subjective:    Patient ID:  Shanta Sterling is a 54 y.o. female who presents for Hypertension and Valvular Heart Disease        HPI  NEGATIVE SE, MILD AI, AT [facility],  DOING OK, NO CHEST PAIN NO SHORTNESS OF BREATH NO TIA TYPE SYMPTOMS NO NEAR-SYNCOPE, SEE ROS    Past Medical History:   Diagnosis Date    Acid reflux     Acute gastritis     Dry eyes     Dry mouth     Heart murmur     Hypertension     [facility]neumonia 2000    [facility]    Sicca syndrome, unspecified     Sjogren's disease     Valvular regurgitation      Past Surgical History:   Procedure Laterality Date    APPENDECTOMY  11/2009         BREAST SURGERY Bilateral 04/2004    Augmentation    FOOT NEUROMA SURGERY Left 1992    HYSTERECTOMY  2002     Family History   Problem Relation Age of Onset    Hypertension Mother     COPD Father     Diabetes Mellitus Father     Heart disease Father     Hypertension Father      Social History     Socioeconomic History    Marital status:      Spouse name: Not on file    Number of children: Not on file    Years of education: Not on file    Highest education level: Not on file   Occupational History    Not on file   Social Needs    Financial resource strain: Not on file    Food insecurity     Worry: Not on file     Inability: Not on file    Transportation needs     Medical: Not on file     Non-medical: Not on file   Tobacco Use    Smoking status: Never Smoker    Smokeless tobacco: Never Used   Substance and Sexual Activity    Alcohol use: Yes     Comment: one drink every edb 6 days/week    Drug use: No    Sexual activity: Not on file   Lifestyle    Physical activity     Days per week: Not on file     Minutes per session: Not on file    Stress: Not on file   Relationships    Social connections     Talks on phone: Not on file     Gets together: Not on file     Attends Quaker service: Not on file     Active member of club or organization: Not on file     Attends meetings of  clubs or organizations: Not on file     Relationship status: Not on file   Other Topics Concern    Not on file   Social History Narrative    Not on file       Review of patient's allergies indicates:   Allergen Reactions    Codeine Other (See Comments)     vomiting       Current Outpatient Medications:     amLODIPine (NORVASC) 5 MG tablet, TAKE 1 TABLET BY MOUTH EVERY DAY, Disp: 90 tablet, Rfl: 2    calcium citrate-vitamin D3 315-200 mg (CITRACAL+D) 315-200 mg-unit per tablet, Take 1 tablet by mouth once daily., Disp: , Rfl:     carboxymethylcellulose sodium (THERA TEARS) 0.25 % Drop, 1 drop as needed., Disp: , Rfl:     cevimeline (EVOXAC) 30 mg capsule, TAKE 1 CAPSULE (30 MG TOTAL) BY MOUTH 2 (TWO) TIMES DAILY., Disp: 180 capsule, Rfl: 3    coenzyme Q10 (CO Q-10) 200 mg capsule, Take 200 mg by mouth once daily., Disp: , Rfl:     estradiol (ESTRACE) 0.5 MG tablet, Take 0.5 mg by mouth once daily., Disp: , Rfl:     hydroxychloroquine (PLAQUENIL) 200 mg tablet, Take 1 tablet (200 mg total) by mouth 2 (two) times daily., Disp: 180 tablet, Rfl: 3    losartan (COZAAR) 25 MG tablet, TAKE 1 TABLET(25 MG) BY MOUTH EVERY EVENING, Disp: 90 tablet, Rfl: 2    multivitamin (MULTIVITAMIN) per tablet, Take 1 tablet by mouth once daily., Disp: , Rfl:     nitroGLYCERIN (NITROSTAT) 0.4 MG SL tablet, PLEASE SEE ATTACHED FOR DETAILED DIRECTIONS (Patient not taking: Reported on 7/10/2020), Disp: 25 tablet, Rfl: 0    omeprazole (PRILOSEC) 40 MG capsule, Take 40 mg by mouth once daily., Disp: , Rfl:     PROAIR RESPICLICK 90 mcg/actuation AePB, INHALE 2 PUFFS PO 30 MINUTES BEFORE EXERCISE, Disp: , Rfl: 5    sucralfate (CARAFATE) 1 gram tablet, TAKE 1 TABLET (1 G TOTAL) BY MOUTH 4 (FOUR) TIMES DAILY. (Patient not taking: Reported on 7/10/2020), Disp: 120 tablet, Rfl: 1    Review of Systems   Constitution: Negative for chills, decreased appetite, diaphoresis, fever, malaise/fatigue and night sweats.   HENT: Negative for  "congestion and nosebleeds.    Eyes: Negative for blurred vision and visual disturbance.   Cardiovascular: Negative for chest pain, claudication, cyanosis, dyspnea on exertion (MINIMAL), irregular heartbeat, leg swelling, near-syncope, orthopnea, palpitations, paroxysmal nocturnal dyspnea and syncope.        STABLE  RAYNAUD'S SX   Respiratory: Negative for cough (BETTER), hemoptysis, shortness of breath, sleep disturbances due to breathing and wheezing.    Endocrine: Negative for cold intolerance, heat intolerance and polyuria.   Hematologic/Lymphatic: Negative for adenopathy and bleeding problem. Does not bruise/bleed easily.   Skin: Negative for itching and rash.   Musculoskeletal: Negative for back pain and falls.   Gastrointestinal: Negative for abdominal pain, change in bowel habit, dysphagia, heartburn, hematemesis, jaundice, melena and vomiting.   Genitourinary: Negative for dysuria, flank pain and frequency.   Neurological: Negative for brief paralysis, dizziness, focal weakness, light-headedness (OCC AFTER RUNNING IN HEAT), loss of balance, tremors and weakness.   Psychiatric/Behavioral: Negative for depression and memory loss.   Allergic/Immunologic: Negative for hives and persistent infections.        Objective:      Vitals:    07/10/20 1142   BP: 127/83   Pulse: 69   Weight: 67.4 kg (148 lb 9.4 oz)   Height: 5' 6" (1.676 m)   PainSc: 0-No pain     Body mass index is 23.98 kg/m².    Physical Exam   Constitutional: She is oriented to person, place, and time. She appears well-developed and well-nourished. She is active.   HENT:   Head: Normocephalic and atraumatic.   Mouth/Throat: Oropharynx is clear and moist and mucous membranes are normal.   Eyes: Pupils are equal, round, and reactive to light. Conjunctivae and EOM are normal.   Neck: Normal range of motion. Neck supple. Normal carotid pulses, no hepatojugular reflux and no JVD present. Carotid bruit is not present. Tracheal deviation present. No edema " and no erythema present. No thyromegaly present.   Cardiovascular: Normal rate and regular rhythm.  No extrasystoles are present. PMI is not displaced. Exam reveals no gallop and no friction rub.   Murmur heard.  High-pitched blowing holosystolic murmur is present at the apex.  High-pitched blowing decrescendo early diastolic murmur is present at the upper right sternal border radiating to the apex.  Pulses:       Carotid pulses are 2+ on the right side and 2+ on the left side.       Radial pulses are 2+ on the right side and 2+ on the left side.        Femoral pulses are 2+ on the right side and 2+ on the left side.       Popliteal pulses are 2+ on the right side and 2+ on the left side.        Dorsalis pedis pulses are 2+ on the right side and 2+ on the left side.        Posterior tibial pulses are 2+ on the right side and 2+ on the left side.   CAPILLARY REFILL IN TOES, FINGERS OK   Pulmonary/Chest: Effort normal and breath sounds normal. No tachypnea and no bradypnea. She has no wheezes. She has no rales. She exhibits no tenderness.   Abdominal: Soft. Bowel sounds are normal. She exhibits no distension and no mass. There is no hepatosplenomegaly. There is no CVA tenderness.   Musculoskeletal: Normal range of motion.         General: No tenderness or edema.   Lymphadenopathy:     She has no cervical adenopathy.   Neurological: She is alert and oriented to person, place, and time. She has normal strength. She displays no tremor. No cranial nerve deficit.   Skin: Skin is warm and dry. No cyanosis or erythema. No pallor.   Psychiatric: She has a normal mood and affect. Her speech is normal and behavior is normal.               ..    Chemistry        Component Value Date/Time     06/12/2019 0931    K 4.5 06/12/2019 0931     06/12/2019 0931    CO2 26 06/12/2019 0931    BUN 14 06/12/2019 0931    CREATININE 1.0 06/12/2019 0931    GLU 75 06/12/2019 0931        Component Value Date/Time    CALCIUM 9.3  06/12/2019 0931    ALKPHOS 55 10/27/2015 0921    AST 23 10/27/2015 0921    ALT 19 10/27/2015 0921    BILITOT 0.7 10/27/2015 0921    ESTGFRAFRICA >60.0 06/12/2019 0931    EGFRNONAA >60.0 06/12/2019 0931            ..No results found for: CHOL  No results found for: HDL  No results found for: LDLCALC  No results found for: TRIG  No results found for: CHOLHDL  ..  Lab Results   Component Value Date    WBC 5.97 10/27/2015    HGB 12.9 10/27/2015    HCT 38.4 10/27/2015    MCV 90 10/27/2015     10/27/2015       Test(s) Reviewed  I have reviewed the following in detail:  [] Stress test   [] Angiography   [x] Echocardiogram   [] Labs   [] Other:       Assessment:         ICD-10-CM ICD-9-CM   1. Essential hypertension  I10 401.9   2. Raynaud's phenomenon without gangrene  I73.00 443.0   3. Nonrheumatic aortic valve insufficiency  I35.1 424.1   4. Agatston CAC score, <100  R93.1 793.2     Problem List Items Addressed This Visit        Cardiac/Vascular    Raynaud's phenomenon    Relevant Orders    Comprehensive metabolic panel    Essential hypertension - Primary    Relevant Orders    Comprehensive metabolic panel    Nonrheumatic aortic valve insufficiency    Relevant Orders    Comprehensive metabolic panel    Agatston CAC score, <100    Relevant Orders    Comprehensive metabolic panel           Plan:         AVOID SUDDEN CHANGE IN TEMPERATURE EXT OR EXPOSURE TO COLD TEMPERATURE.ALL CV CLINICALLY STABLE, NO ANGINA, NO HF, NO TIA, NO CLINICAL ARRHYTHMIA,CONTINUE CURRENT MEDS, EDUCATION, DIET, EXERCISE, RETURN TO CLINIC IN 7-9 MONTHS OR  Essential hypertension  -     Comprehensive metabolic panel; Future; Expected date: 07/10/2020    Raynaud's phenomenon without gangrene  -     Comprehensive metabolic panel; Future; Expected date: 07/10/2020    Nonrheumatic aortic valve insufficiency  -     Comprehensive metabolic panel; Future; Expected date: 07/10/2020    Agatston CAC score, <100  -     Comprehensive metabolic panel;  Future; Expected date: 07/10/2020    RTC Low level/low impact aerobic exercise 5x's/wk. Heart healthy diet and risk factor modification.    See labs and med orders.    Aerobic exercise 5x's/wk. Heart healthy diet and risk factor modification.    See labs and med orders.

## 2020-09-15 NOTE — PROGRESS NOTES
"Subjective:       Patient ID: Shanta Sterling is a 54 y.o. female with Sjogren's syndrome.    Chief Complaint: No chief complaint on file.  She returns for follow up. 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    She was last seen on 1/8/20. In the interim dx w Covid 19  ([facility]). In August developed more night sweats, body aches, headaches & sore throat. Also fatigued. Stopped exercising. Had positive rapid Covid test on 8/25 and stayed home x 2 weeks. Never retested. Back at work. Still has some mild sxs.     Sjogren's wise remains stable. Still on  mg qd with eye exams q 6 months and evoxac 2-3 xday. She could not tolerate restasis. Uses OTC eye drops.  Her dry mouth is controlled on evoxac and she sees the dentist 2 x/year.  She gets Raynaud's but it is controlled on amlodipine. She has no ischemic changes, no digital scars, loss of pulp etc.  She has very minimal AM stiffness & denies joint pain or swelling. Denies dysphagia, tight skin, alopecia, pleurisy, pericarditis, photosensitivity, skin rashes.  Has had some oral ulcers in the past. May also have had reactive airway disease. Still taking CoQ 100 mg which has helped myalgias (in past) considerably.      She is taking OTC vitamin D3 and taking her calcium through the diet as discussed. .     She is followed by Dr. Jefferson Rivers in Cardiology.  (Cardiology has documented heart murmurs and dx MR and AR: " High-pitched blowing holosystolic murmur is present  at the apex; High-pitched blowing decrescendo early diastolic murmur is present  at the upper right sternal border radiating to the apex" ).         Current Outpatient Medications   Medication Sig Dispense Refill    amLODIPine (NORVASC) 5 MG tablet TAKE 1 TABLET BY MOUTH EVERY DAY 90 tablet 2    calcium citrate-vitamin D3 315-200 mg (CITRACAL+D) 315-200 mg-unit per tablet Take 1 tablet by mouth once daily.   "    carboxymethylcellulose sodium (THERA TEARS) 0.25 % Drop 1 drop as needed.      cevimeline (EVOXAC) 30 mg capsule TAKE 1 CAPSULE (30 MG TOTAL) BY MOUTH 2 (TWO) TIMES DAILY. 180 capsule 3    coenzyme Q10 (CO Q-10) 200 mg capsule Take 200 mg by mouth once daily.      estradiol (ESTRACE) 0.5 MG tablet Take 0.5 mg by mouth once daily.      hydroxychloroquine (PLAQUENIL) 200 mg tablet Take 1 tablet (200 mg total) by mouth 2 (two) times daily. 180 tablet 3    losartan (COZAAR) 25 MG tablet TAKE 1 TABLET(25 MG) BY MOUTH EVERY EVENING 90 tablet 2    multivitamin (MULTIVITAMIN) per tablet Take 1 tablet by mouth once daily.      nitroGLYCERIN (NITROSTAT) 0.4 MG SL tablet PLEASE SEE ATTACHED FOR DETAILED DIRECTIONS (Patient not taking: Reported on 7/10/2020) 25 tablet 0    omeprazole (PRILOSEC) 40 MG capsule Take 40 mg by mouth once daily.      PROAIR RESPICLICK 90 mcg/actuation AePB INHALE 2 PUFFS PO 30 MINUTES BEFORE EXERCISE  5    sucralfate (CARAFATE) 1 gram tablet TAKE 1 TABLET (1 G TOTAL) BY MOUTH 4 (FOUR) TIMES DAILY. (Patient not taking: Reported on 7/10/2020) 120 tablet 1     No current facility-administered medications for this visit.    Not taking calcium citrate: takes calcium through the diet.   Takes almond milk for calcium source.      Allergies   Allergen Reactions    Codeine Other (See Comments)     vomiting     Past Medical History:   Diagnosis Date    Acid reflux     Acute gastritis     Dry eyes     Dry mouth     Heart murmur     Hypertension     [facility]    Sicca syndrome, unspecified     Sjogren's disease     Valvular regurgitation      Past Surgical History:   Procedure Laterality Date    APPENDECTOMY  11/2009         BREAST SURGERY Bilateral 04/2004    Augmentation    FOOT NEUROMA SURGERY Left 1992    HYSTERECTOMY  2002         Review of Systems   Constitutional: Positive for diaphoresis. Negative for fatigue, fever and unexpected weight change.  "  HENT: Negative.  Negative for sore throat, tinnitus and trouble swallowing.         Dry mouth   Eyes: Negative.  Negative for redness and visual disturbance.        Dry eyes   Respiratory: Negative.  Negative for cough, choking, chest tightness and shortness of breath.    Cardiovascular: Negative.  Negative for chest pain, palpitations and leg swelling.   Gastrointestinal: Negative.  Negative for abdominal distention, abdominal pain, blood in stool, constipation, diarrhea, nausea and vomiting.        Heartburn   Endocrine: Negative.    Genitourinary: Negative.  Negative for dysuria, frequency, genital sores, hematuria and menstrual problem.   Musculoskeletal: Negative.  Negative for back pain, joint swelling, myalgias, neck pain and neck stiffness.   Skin: Negative.  Negative for rash.   Allergic/Immunologic: Negative.    Neurological: Negative.  Negative for dizziness, syncope, weakness, light-headedness, numbness and headaches.   Hematological: Negative.  Negative for adenopathy. Does not bruise/bleed easily.   Psychiatric/Behavioral: Positive for sleep disturbance. Negative for dysphoric mood. The patient is nervous/anxious.         Anxiety intensified during Covid.         Family History   Problem Relation Age of Onset    Hypertension Mother     COPD Father     Diabetes Mellitus Father     Heart disease Father     Hypertension Father        SH: non smoker; social drinker.  She is  at [employer] in [place].  Very active up to Covid; would try to run 3 x/week. Starting up again.     Objective:   /83   Pulse 64   Ht 5' 6" (1.676 m)   Wt 65.5 kg (144 lb 6.4 oz)   BMI 23.31 kg/m²     Physical Exam   Vitals reviewed.  Constitutional: She is oriented to person, place, and time and well-developed, well-nourished, and in no distress. No distress.   HENT:   Head: Normocephalic and atraumatic.   Mouth/Throat: Oropharynx is clear and moist. No oropharyngeal exudate.   No " "facial rashes  Parotids not enlarged     Eyes: Conjunctivae and EOM are normal. Pupils are equal, round, and reactive to light. Right eye exhibits no discharge. Left eye exhibits no discharge. No scleral icterus.   Neck: Neck supple. No JVD present. No tracheal deviation present. No thyromegaly present.   Cardiovascular: Normal rate, regular rhythm, normal heart sounds and intact distal pulses.  Exam reveals no gallop and no friction rub.    No murmur heard.  Murmurs reported by cardiologist: "High-pitched blowing holosystolic murmur is present  at the apex  High-pitched blowing decrescendo early diastolic murmur is present  at the upper right sternal border radiating to the apex"   Pulmonary/Chest: Effort normal and breath sounds normal. No respiratory distress. She has no wheezes. She has no rales. She exhibits no tenderness.   Abdominal: Soft. Bowel sounds are normal. She exhibits no distension and no mass. There is no splenomegaly or hepatomegaly. There is no abdominal tenderness. There is no rebound and no guarding.   Lymphadenopathy:     She has no cervical adenopathy.   Neurological: She is alert and oriented to person, place, and time. She has normal reflexes. No cranial nerve deficit. Gait normal.   Proximal and distal muscle strength 5/5.   Skin: Skin is warm and dry. No rash noted. She is not diaphoretic.     Psychiatric: Mood, memory, affect and judgment normal.   Musculoskeletal: Normal range of motion. No tenderness or edema.      Comments: Cspine FROM no tenderness  Tspine FROM no tenderness  Lspine FROM no tenderness.  TMJ: unremarkable  Shoulders: FROM now on L; non tender; no synovitis; R ok;  Elbows: FROM; no synovitis; no tophi or nodules  Wrists: FROM; no synovitis;   MCPs: Minimal squaring of 1st C_MCP joints bilaterally. FROM; no synovitis; no metacarpalgia;  ok;  PIPs:FROM; no synovitis;   DIPs: FROM; no synovitis;   HIPS: FROM  Knees: FROM; no synovitis; no instability;  Ankles: FROM: no " synovitis   Toes: ok; no metatarsalgia.             LABS:     19: BMP ok;   18: BMP: cnne 1.0;   3/13/18: + MARIA DOLORES +SSA; rest of pro neg; UA ok;   18: TFTs ok;   18: BMP cnne 1.1 GFR 57.5; BUN 21  10/27/15: ESR 3: CRP 2.2; CBC, CMP ok;   14: ESR 2; CRP 0.7; CBC ok; alb. 3.4;   13: ESR 10; CRP 0.7; CBC ok cnne 1.1; GFR 53;   MARIA DOLORES 1:160sp; +SSA; +SSB; +RF (53);   Neg or wnl: CCP, C3, C4, Scl-70; UA; LAC; aCLA; [id number]  Vit D 56       20: EKG: OK    Imagin13: Arthritis survey personally reviewed: unremarkable     Assessment:   Sjogren's syndrome since  on HCQ since --doing very well.   On  mg/d & evoxac 30 mg bid   +MARIA DOLORES, +SSA, +SSB, +RF    with dry eyes (intolerant to restasis),    dry mouth (on evoxac, intolerant to pilocarpine)    associated with polyarthritis (pain & swelling: hands, elbows, shoulders, knees & feet)     Feels CoQ 10 helps muscle/joint pains   Occasional oral ulcers & upper dysphagia.    Raynaud's  & AM stiffness     RP helped by amlodipine 2.5 mg/d   mother with UCTD     Hx of Covid-19 infection    Body aches, headaches, fatigue, sore throat, anxiety; no fever   + rapid test     Menopausal   S/P hysterectomy wo BSO   + vasomotor sxs-night sweats   On estradiol    Mitral and Aortic Insufficiency as per cardiology    Essential hypertension    Renal insufficiency precipitated by Aleve in past.      S/P L adhesive capsulitis with calcification head of humerus--much improved   Responsive to injection & some PT          Plan:   Continue  mg/day + evoxac 30 mg bid-tid.  Continue Eye exams q 6-12 months  Continue Vitamin D 5,000 IU once a week.  Calcium through diet as discussed.  Resume, daily, low impact aerobic exercise.  Labs ordered (External--App47) to be done ~ 1 months.  RTC 6 months or prn.

## 2020-09-22 ENCOUNTER — OFFICE VISIT (OUTPATIENT)
Dept: RHEUMATOLOGY | Facility: CLINIC | Age: 55
End: 2020-09-22
Payer: COMMERCIAL

## 2020-09-22 VITALS
HEIGHT: 66 IN | WEIGHT: 144.38 LBS | HEART RATE: 64 BPM | BODY MASS INDEX: 23.2 KG/M2 | DIASTOLIC BLOOD PRESSURE: 83 MMHG | SYSTOLIC BLOOD PRESSURE: 128 MMHG

## 2020-09-22 DIAGNOSIS — Z79.899 LONG-TERM USE OF HYDROXYCHLOROQUINE: ICD-10-CM

## 2020-09-22 DIAGNOSIS — M75.02 ADHESIVE CAPSULITIS OF LEFT SHOULDER: ICD-10-CM

## 2020-09-22 DIAGNOSIS — I73.00 RAYNAUD'S PHENOMENON WITHOUT GANGRENE: ICD-10-CM

## 2020-09-22 DIAGNOSIS — M35.00 SJOGREN'S SYNDROME, WITH UNSPECIFIED ORGAN INVOLVEMENT: Primary | ICD-10-CM

## 2020-09-22 DIAGNOSIS — Z86.16 HISTORY OF 2019 NOVEL CORONAVIRUS DISEASE (COVID-19): ICD-10-CM

## 2020-09-22 PROCEDURE — 3079F DIAST BP 80-89 MM HG: CPT | Mod: CPTII,S$GLB,, | Performed by: INTERNAL MEDICINE

## 2020-09-22 PROCEDURE — 3079F PR MOST RECENT DIASTOLIC BLOOD PRESSURE 80-89 MM HG: ICD-10-PCS | Mod: CPTII,S$GLB,, | Performed by: INTERNAL MEDICINE

## 2020-09-22 PROCEDURE — 99214 PR OFFICE/OUTPT VISIT, EST, LEVL IV, 30-39 MIN: ICD-10-PCS | Mod: S$GLB,,, | Performed by: INTERNAL MEDICINE

## 2020-09-22 PROCEDURE — 99999 PR PBB SHADOW E&M-EST. PATIENT-LVL IV: CPT | Mod: PBBFAC,,, | Performed by: INTERNAL MEDICINE

## 2020-09-22 PROCEDURE — 99999 PR PBB SHADOW E&M-EST. PATIENT-LVL IV: ICD-10-PCS | Mod: PBBFAC,,, | Performed by: INTERNAL MEDICINE

## 2020-09-22 PROCEDURE — 99214 OFFICE O/P EST MOD 30 MIN: CPT | Mod: S$GLB,,, | Performed by: INTERNAL MEDICINE

## 2020-09-22 PROCEDURE — 3008F PR BODY MASS INDEX (BMI) DOCUMENTED: ICD-10-PCS | Mod: CPTII,S$GLB,, | Performed by: INTERNAL MEDICINE

## 2020-09-22 PROCEDURE — 3074F PR MOST RECENT SYSTOLIC BLOOD PRESSURE < 130 MM HG: ICD-10-PCS | Mod: CPTII,S$GLB,, | Performed by: INTERNAL MEDICINE

## 2020-09-22 PROCEDURE — 3008F BODY MASS INDEX DOCD: CPT | Mod: CPTII,S$GLB,, | Performed by: INTERNAL MEDICINE

## 2020-09-22 PROCEDURE — 3074F SYST BP LT 130 MM HG: CPT | Mod: CPTII,S$GLB,, | Performed by: INTERNAL MEDICINE

## 2021-02-23 ENCOUNTER — PATIENT MESSAGE (OUTPATIENT)
Dept: RHEUMATOLOGY | Facility: CLINIC | Age: 56
End: 2021-02-23

## 2021-04-29 ENCOUNTER — PATIENT MESSAGE (OUTPATIENT)
Dept: RESEARCH | Facility: HOSPITAL | Age: 56
End: 2021-04-29

## 2021-05-11 ENCOUNTER — TELEPHONE (OUTPATIENT)
Dept: CARDIOLOGY | Facility: CLINIC | Age: 56
End: 2021-05-11

## 2021-05-31 ENCOUNTER — OFFICE VISIT (OUTPATIENT)
Dept: CARDIOLOGY | Facility: CLINIC | Age: 56
End: 2021-05-31
Payer: COMMERCIAL

## 2021-05-31 VITALS
SYSTOLIC BLOOD PRESSURE: 130 MMHG | HEIGHT: 66 IN | HEART RATE: 59 BPM | DIASTOLIC BLOOD PRESSURE: 82 MMHG | WEIGHT: 137.81 LBS | BODY MASS INDEX: 22.15 KG/M2

## 2021-05-31 DIAGNOSIS — I10 ESSENTIAL HYPERTENSION: Primary | Chronic | ICD-10-CM

## 2021-05-31 DIAGNOSIS — I35.1 NONRHEUMATIC AORTIC VALVE INSUFFICIENCY: Chronic | ICD-10-CM

## 2021-05-31 DIAGNOSIS — I73.00 RAYNAUD'S PHENOMENON WITHOUT GANGRENE: Chronic | ICD-10-CM

## 2021-05-31 DIAGNOSIS — N18.2 CHRONIC KIDNEY DISEASE, STAGE 2, MILDLY DECREASED GFR: Chronic | ICD-10-CM

## 2021-05-31 DIAGNOSIS — R93.1 AGATSTON CAC SCORE, <100: Chronic | ICD-10-CM

## 2021-05-31 PROCEDURE — 1126F AMNT PAIN NOTED NONE PRSNT: CPT | Mod: S$GLB,,, | Performed by: INTERNAL MEDICINE

## 2021-05-31 PROCEDURE — 3008F PR BODY MASS INDEX (BMI) DOCUMENTED: ICD-10-PCS | Mod: CPTII,S$GLB,, | Performed by: INTERNAL MEDICINE

## 2021-05-31 PROCEDURE — 3075F SYST BP GE 130 - 139MM HG: CPT | Mod: CPTII,S$GLB,, | Performed by: INTERNAL MEDICINE

## 2021-05-31 PROCEDURE — 3008F BODY MASS INDEX DOCD: CPT | Mod: CPTII,S$GLB,, | Performed by: INTERNAL MEDICINE

## 2021-05-31 PROCEDURE — 99214 PR OFFICE/OUTPT VISIT, EST, LEVL IV, 30-39 MIN: ICD-10-PCS | Mod: S$GLB,,, | Performed by: INTERNAL MEDICINE

## 2021-05-31 PROCEDURE — 99999 PR PBB SHADOW E&M-EST. PATIENT-LVL III: ICD-10-PCS | Mod: PBBFAC,,, | Performed by: INTERNAL MEDICINE

## 2021-05-31 PROCEDURE — 3079F PR MOST RECENT DIASTOLIC BLOOD PRESSURE 80-89 MM HG: ICD-10-PCS | Mod: CPTII,S$GLB,, | Performed by: INTERNAL MEDICINE

## 2021-05-31 PROCEDURE — 99999 PR PBB SHADOW E&M-EST. PATIENT-LVL III: CPT | Mod: PBBFAC,,, | Performed by: INTERNAL MEDICINE

## 2021-05-31 PROCEDURE — 3079F DIAST BP 80-89 MM HG: CPT | Mod: CPTII,S$GLB,, | Performed by: INTERNAL MEDICINE

## 2021-05-31 PROCEDURE — 1126F PR PAIN SEVERITY QUANTIFIED, NO PAIN PRESENT: ICD-10-PCS | Mod: S$GLB,,, | Performed by: INTERNAL MEDICINE

## 2021-05-31 PROCEDURE — 3075F PR MOST RECENT SYSTOLIC BLOOD PRESS GE 130-139MM HG: ICD-10-PCS | Mod: CPTII,S$GLB,, | Performed by: INTERNAL MEDICINE

## 2021-05-31 PROCEDURE — 99214 OFFICE O/P EST MOD 30 MIN: CPT | Mod: S$GLB,,, | Performed by: INTERNAL MEDICINE

## 2021-05-31 RX ORDER — AMLODIPINE BESYLATE 5 MG/1
5 TABLET ORAL DAILY
Qty: 90 TABLET | Refills: 3 | Status: SHIPPED | OUTPATIENT
Start: 2021-05-31 | End: 2022-07-10

## 2021-05-31 RX ORDER — LOSARTAN POTASSIUM 25 MG/1
25 TABLET ORAL NIGHTLY
Qty: 90 TABLET | Refills: 3 | Status: SHIPPED | OUTPATIENT
Start: 2021-05-31 | End: 2022-06-05

## 2021-08-01 ENCOUNTER — PATIENT MESSAGE (OUTPATIENT)
Dept: CARDIOLOGY | Facility: CLINIC | Age: 56
End: 2021-08-01

## 2021-08-02 DIAGNOSIS — Z91.89 AT RISK FOR CORONARY ARTERY DISEASE: Primary | ICD-10-CM

## 2021-08-02 DIAGNOSIS — Z13.220 LIPID SCREENING: ICD-10-CM

## 2021-08-21 ENCOUNTER — PATIENT MESSAGE (OUTPATIENT)
Dept: CARDIOLOGY | Facility: CLINIC | Age: 56
End: 2021-08-21

## 2021-08-23 DIAGNOSIS — N18.2 CHRONIC KIDNEY DISEASE, STAGE 2, MILDLY DECREASED GFR: Primary | ICD-10-CM

## 2021-10-07 ENCOUNTER — OFFICE VISIT (OUTPATIENT)
Dept: RHEUMATOLOGY | Facility: CLINIC | Age: 56
End: 2021-10-07
Payer: COMMERCIAL

## 2021-10-07 VITALS
BODY MASS INDEX: 23.13 KG/M2 | HEART RATE: 57 BPM | WEIGHT: 143.94 LBS | SYSTOLIC BLOOD PRESSURE: 146 MMHG | DIASTOLIC BLOOD PRESSURE: 84 MMHG | HEIGHT: 66 IN

## 2021-10-07 DIAGNOSIS — Z79.899 LONG-TERM USE OF HYDROXYCHLOROQUINE: ICD-10-CM

## 2021-10-07 DIAGNOSIS — I73.00 RAYNAUD'S PHENOMENON WITHOUT GANGRENE: ICD-10-CM

## 2021-10-07 DIAGNOSIS — M35.00 SJOGREN'S SYNDROME, WITH UNSPECIFIED ORGAN INVOLVEMENT: Primary | ICD-10-CM

## 2021-10-07 PROCEDURE — 1160F RVW MEDS BY RX/DR IN RCRD: CPT | Mod: CPTII,S$GLB,, | Performed by: INTERNAL MEDICINE

## 2021-10-07 PROCEDURE — 99999 PR PBB SHADOW E&M-EST. PATIENT-LVL IV: ICD-10-PCS | Mod: PBBFAC,,, | Performed by: INTERNAL MEDICINE

## 2021-10-07 PROCEDURE — 3079F PR MOST RECENT DIASTOLIC BLOOD PRESSURE 80-89 MM HG: ICD-10-PCS | Mod: CPTII,S$GLB,, | Performed by: INTERNAL MEDICINE

## 2021-10-07 PROCEDURE — 99214 PR OFFICE/OUTPT VISIT, EST, LEVL IV, 30-39 MIN: ICD-10-PCS | Mod: S$GLB,,, | Performed by: INTERNAL MEDICINE

## 2021-10-07 PROCEDURE — 4010F ACE/ARB THERAPY RXD/TAKEN: CPT | Mod: CPTII,S$GLB,, | Performed by: INTERNAL MEDICINE

## 2021-10-07 PROCEDURE — 3008F PR BODY MASS INDEX (BMI) DOCUMENTED: ICD-10-PCS | Mod: CPTII,S$GLB,, | Performed by: INTERNAL MEDICINE

## 2021-10-07 PROCEDURE — 3077F PR MOST RECENT SYSTOLIC BLOOD PRESSURE >= 140 MM HG: ICD-10-PCS | Mod: CPTII,S$GLB,, | Performed by: INTERNAL MEDICINE

## 2021-10-07 PROCEDURE — 1160F PR REVIEW ALL MEDS BY PRESCRIBER/CLIN PHARMACIST DOCUMENTED: ICD-10-PCS | Mod: CPTII,S$GLB,, | Performed by: INTERNAL MEDICINE

## 2021-10-07 PROCEDURE — 3079F DIAST BP 80-89 MM HG: CPT | Mod: CPTII,S$GLB,, | Performed by: INTERNAL MEDICINE

## 2021-10-07 PROCEDURE — 99999 PR PBB SHADOW E&M-EST. PATIENT-LVL IV: CPT | Mod: PBBFAC,,, | Performed by: INTERNAL MEDICINE

## 2021-10-07 PROCEDURE — 3077F SYST BP >= 140 MM HG: CPT | Mod: CPTII,S$GLB,, | Performed by: INTERNAL MEDICINE

## 2021-10-07 PROCEDURE — 1159F MED LIST DOCD IN RCRD: CPT | Mod: CPTII,S$GLB,, | Performed by: INTERNAL MEDICINE

## 2021-10-07 PROCEDURE — 4010F PR ACE/ARB THEARPY RXD/TAKEN: ICD-10-PCS | Mod: CPTII,S$GLB,, | Performed by: INTERNAL MEDICINE

## 2021-10-07 PROCEDURE — 3008F BODY MASS INDEX DOCD: CPT | Mod: CPTII,S$GLB,, | Performed by: INTERNAL MEDICINE

## 2021-10-07 PROCEDURE — 1159F PR MEDICATION LIST DOCUMENTED IN MEDICAL RECORD: ICD-10-PCS | Mod: CPTII,S$GLB,, | Performed by: INTERNAL MEDICINE

## 2021-10-07 PROCEDURE — 99214 OFFICE O/P EST MOD 30 MIN: CPT | Mod: S$GLB,,, | Performed by: INTERNAL MEDICINE

## 2021-10-07 ASSESSMENT — ROUTINE ASSESSMENT OF PATIENT INDEX DATA (RAPID3)
WHEN YOU AWAKENED IN THE MORNING OVER THE LAST WEEK, PLEASE INDICATE THE AMOUNT OF TIME IT TAKES UNTIL YOU ARE AS LIMBER AS YOU WILL BE FOR THE DAY: 2
PAIN SCORE: 0.5
PATIENT GLOBAL ASSESSMENT SCORE: 0
AM STIFFNESS SCORE: 1, YES
TOTAL RAPID3 SCORE: 0.17
MDHAQ FUNCTION SCORE: 0
FATIGUE SCORE: 0

## 2021-12-03 ENCOUNTER — TELEPHONE (OUTPATIENT)
Dept: CARDIOLOGY | Facility: CLINIC | Age: 56
End: 2021-12-03
Payer: COMMERCIAL

## 2022-02-23 DIAGNOSIS — D84.9 IMMUNOSUPPRESSED STATUS: ICD-10-CM

## 2022-04-11 ENCOUNTER — OFFICE VISIT (OUTPATIENT)
Dept: OBSTETRICS AND GYNECOLOGY | Facility: CLINIC | Age: 57
End: 2022-04-11
Payer: COMMERCIAL

## 2022-04-11 VITALS — HEIGHT: 66 IN | BODY MASS INDEX: 23.06 KG/M2 | WEIGHT: 143.5 LBS

## 2022-04-11 DIAGNOSIS — Z12.31 VISIT FOR SCREENING MAMMOGRAM: Primary | ICD-10-CM

## 2022-04-11 PROCEDURE — 4010F ACE/ARB THERAPY RXD/TAKEN: CPT | Mod: CPTII,S$GLB,, | Performed by: OBSTETRICS & GYNECOLOGY

## 2022-04-11 PROCEDURE — 1159F MED LIST DOCD IN RCRD: CPT | Mod: CPTII,S$GLB,, | Performed by: OBSTETRICS & GYNECOLOGY

## 2022-04-11 PROCEDURE — 99999 PR PBB SHADOW E&M-EST. PATIENT-LVL III: ICD-10-PCS | Mod: PBBFAC,,, | Performed by: OBSTETRICS & GYNECOLOGY

## 2022-04-11 PROCEDURE — 99386 PR PREVENTIVE VISIT,NEW,40-64: ICD-10-PCS | Mod: S$GLB,,, | Performed by: OBSTETRICS & GYNECOLOGY

## 2022-04-11 PROCEDURE — 99999 PR PBB SHADOW E&M-EST. PATIENT-LVL III: CPT | Mod: PBBFAC,,, | Performed by: OBSTETRICS & GYNECOLOGY

## 2022-04-11 PROCEDURE — 4010F PR ACE/ARB THEARPY RXD/TAKEN: ICD-10-PCS | Mod: CPTII,S$GLB,, | Performed by: OBSTETRICS & GYNECOLOGY

## 2022-04-11 PROCEDURE — 99386 PREV VISIT NEW AGE 40-64: CPT | Mod: S$GLB,,, | Performed by: OBSTETRICS & GYNECOLOGY

## 2022-04-11 PROCEDURE — 3008F BODY MASS INDEX DOCD: CPT | Mod: CPTII,S$GLB,, | Performed by: OBSTETRICS & GYNECOLOGY

## 2022-04-11 PROCEDURE — 1159F PR MEDICATION LIST DOCUMENTED IN MEDICAL RECORD: ICD-10-PCS | Mod: CPTII,S$GLB,, | Performed by: OBSTETRICS & GYNECOLOGY

## 2022-04-11 PROCEDURE — 3008F PR BODY MASS INDEX (BMI) DOCUMENTED: ICD-10-PCS | Mod: CPTII,S$GLB,, | Performed by: OBSTETRICS & GYNECOLOGY

## 2022-04-11 RX ORDER — ESTRADIOL 0.5 MG/1
0.5 TABLET ORAL DAILY
Qty: 90 TABLET | Refills: 3 | Status: SHIPPED | OUTPATIENT
Start: 2022-04-11 | End: 2023-04-17

## 2022-04-11 NOTE — PROGRESS NOTES
Chief Complaint   Patient presents with    [facility]    Hormone replacement therapy       History and Physical:  No LMP recorded. Patient has had a hysterectomy.       Shanta Sterling is a 56 y.o.  female who presents today for her routine annual GYN exam. The patient has no Gynecology complaints today. On hrt- counseled, was seeing Lucy in [place] - retired.       Allergies:   Review of patient's allergies indicates:   Allergen Reactions    Codeine Other (See Comments)     vomiting       Past Medical History:   Diagnosis Date    Acid reflux     Acute gastritis     Dry eyes     Dry mouth     Heart murmur     Hypertension     [facility]    Sicca syndrome, unspecified     Sjogren's disease     Valvular regurgitation        Past Surgical History:   Procedure Laterality Date    APPENDECTOMY  11/2009         BREAST SURGERY Bilateral 04/2004    Augmentation    FOOT NEUROMA SURGERY Left 1992    HYSTERECTOMY  2002       MEDS:   Current Outpatient Medications on File Prior to Visit   Medication Sig Dispense Refill    amLODIPine (NORVASC) 5 MG tablet Take 1 tablet (5 mg total) by mouth once daily. 90 tablet 3    calcium citrate-vitamin D3 315-200 mg (CITRACAL+D) 315-200 mg-unit per tablet Take 1 tablet by mouth once daily.      carboxymethylcellulose sodium (THERA TEARS) 0.25 % Drop 1 drop as needed.      cevimeline (EVOXAC) 30 mg capsule TAKE 1 CAPSULE (30 MG TOTAL) BY MOUTH 3 (THREE) TIMES DAILY. 270 capsule 3    coenzyme Q10 (CO Q-10) 200 mg capsule Take 200 mg by mouth once daily.      estradiol (ESTRACE) 0.5 MG tablet Take 0.5 mg by mouth once daily.      hydrOXYchloroQUINE (PLAQUENIL) 200 mg tablet TAKE 1 TABLET BY MOUTH TWICE A  tablet 3    losartan (COZAAR) 25 MG tablet Take 1 tablet (25 mg total) by mouth every evening. 90 tablet 3    multivitamin (MULTIVITAMIN) per tablet Take 1 tablet by mouth once daily.      PROAIR RESPICLICK 90  "mcg/actuation AePB INHALE 2 PUFFS PO 30 MINUTES BEFORE EXERCISE  5     No current facility-administered medications on file prior to visit.       OB History    No obstetric history on file.         Social History     Socioeconomic History    Marital status:    Tobacco Use    Smoking status: Never Smoker    Smokeless tobacco: Never Used   Substance and Sexual Activity    Alcohol use: Yes     Comment: one drink every deb 6 days/week    Drug use: No       Family History   Problem Relation Age of Onset    Hypertension Mother     COPD Father     Diabetes Mellitus Father     Heart disease Father     Hypertension Father          Past medical and surgical history reviewed.   I have reviewed the patient's medical history in detail and updated the computerized patient record.        Review of System:   General: no chills, fever, night sweats, weight gain or weight loss  Psychological: no depression or suicidal ideation  Breasts: no new or changing breast lumps, nipple discharge or masses.  Respiratory: no cough, shortness of breath, or wheezing  Cardiovascular: no chest pain or dyspnea on exertion  Gastrointestinal: no abdominal pain, change in bowel habits, or black or bloody stools  Genito-Urinary: no incontinence, urinary frequency/urgency or vulvar/vaginal symptoms, pelvic pain or abnormal vaginal bleeding.  Musculoskeletal: no gait disturbance or muscular weakness      Physical Exam:   Ht 5' 6" (1.676 m)   Wt 65.1 kg (143 lb 8.3 oz)   BMI 23.16 kg/m²   Constitutional: She is oriented to person, place, and time. She appears well-developed and well-nourished. No distress.thin  HENT:   Head: Normocephalic and atraumatic.   Eyes: Conjunctivae and EOM are normal. No scleral icterus.   Neck: Normal range of motion. Neck supple. No tracheal deviation present.   Cardiovascular: Normal rate.    Pulmonary/Chest: Effort normal. No respiratory distress. She exhibits no tenderness.  Breasts: are symmetrical.   " Right breast exhibits no inverted nipple, no mass, no nipple discharge, no skin change and no tenderness.   Left breast exhibits no inverted nipple, no mass, no nipple discharge, no skin change and no tenderness.  Abdominal: Soft. She exhibits no distension and no mass. There is no tenderness. There is no rebound and no guarding.   Genitourinary:    External rectal exam shows no thrombosed external hemorrhoids.    Pelvic exam was performed with patient supine.   No labial fusion.   There is no rash, lesion or injury on the right labia.   There is no rash, lesion or injury on the left labia.   No bleeding and no signs of injury around the vaginal introitus, urethra is without lesions and well supported.    No vaginal discharge found.    No significant Cystocele, Enterocele or rectocele, and cuff well supported.   Bimanual exam:   The urethra and vagina are without palpable masses or tenderness.   Uterus and cervix are surgically absents, vaginal cuff is intact and well supported.   Right adnexum displays no mass and no tenderness.   Left adnexum displays no mass and no tenderness.  Musculoskeletal: Normal range of motion.   Lymphadenopathy: No inguinal adenopathy present.   Neurological: She is alert and oriented to person, place, and time. Coordination normal.   Skin: Skin is warm and dry. She is not diaphoretic.   Psychiatric: She has a normal mood and affect.        Assessment:   Normal annual GYN exam  1. Visit for screening mammogram  Mammo Digital Screening Bilat w/ Dung    Mammo Digital Screening Bilat w/ Dung   on hrt - counseled, will try weaning    Plan:   PAP Not Needed  Mammogram - [facility]  Refill Hormone Replacement Therapy   Follow up in 1 year.

## 2022-04-26 ENCOUNTER — DOCUMENTATION ONLY (OUTPATIENT)
Dept: OBSTETRICS AND GYNECOLOGY | Facility: CLINIC | Age: 57
End: 2022-04-26
Payer: COMMERCIAL

## 2022-04-26 ENCOUNTER — PATIENT MESSAGE (OUTPATIENT)
Dept: OBSTETRICS AND GYNECOLOGY | Facility: CLINIC | Age: 57
End: 2022-04-26
Payer: COMMERCIAL

## 2022-07-28 ENCOUNTER — OFFICE VISIT (OUTPATIENT)
Dept: CARDIOLOGY | Facility: CLINIC | Age: 57
End: 2022-07-28
Payer: COMMERCIAL

## 2022-07-28 VITALS
HEART RATE: 64 BPM | DIASTOLIC BLOOD PRESSURE: 86 MMHG | SYSTOLIC BLOOD PRESSURE: 119 MMHG | HEIGHT: 66 IN | WEIGHT: 143.75 LBS | BODY MASS INDEX: 23.1 KG/M2

## 2022-07-28 DIAGNOSIS — I10 ESSENTIAL HYPERTENSION: Chronic | ICD-10-CM

## 2022-07-28 DIAGNOSIS — R93.1 AGATSTON CAC SCORE, <100: Chronic | ICD-10-CM

## 2022-07-28 DIAGNOSIS — I08.0 MITRAL AND AORTIC REGURGITATION: Primary | Chronic | ICD-10-CM

## 2022-07-28 DIAGNOSIS — I73.00 RAYNAUD'S PHENOMENON WITHOUT GANGRENE: Chronic | ICD-10-CM

## 2022-07-28 PROCEDURE — 99214 OFFICE O/P EST MOD 30 MIN: CPT | Mod: S$GLB,,, | Performed by: INTERNAL MEDICINE

## 2022-07-28 PROCEDURE — 99999 PR PBB SHADOW E&M-EST. PATIENT-LVL III: CPT | Mod: PBBFAC,,, | Performed by: INTERNAL MEDICINE

## 2022-07-28 PROCEDURE — 3008F BODY MASS INDEX DOCD: CPT | Mod: CPTII,S$GLB,, | Performed by: INTERNAL MEDICINE

## 2022-07-28 PROCEDURE — 99999 PR PBB SHADOW E&M-EST. PATIENT-LVL III: ICD-10-PCS | Mod: PBBFAC,,, | Performed by: INTERNAL MEDICINE

## 2022-07-28 PROCEDURE — 3008F PR BODY MASS INDEX (BMI) DOCUMENTED: ICD-10-PCS | Mod: CPTII,S$GLB,, | Performed by: INTERNAL MEDICINE

## 2022-07-28 PROCEDURE — 99214 PR OFFICE/OUTPT VISIT, EST, LEVL IV, 30-39 MIN: ICD-10-PCS | Mod: S$GLB,,, | Performed by: INTERNAL MEDICINE

## 2022-07-28 PROCEDURE — 4010F PR ACE/ARB THEARPY RXD/TAKEN: ICD-10-PCS | Mod: CPTII,S$GLB,, | Performed by: INTERNAL MEDICINE

## 2022-07-28 PROCEDURE — 3079F PR MOST RECENT DIASTOLIC BLOOD PRESSURE 80-89 MM HG: ICD-10-PCS | Mod: CPTII,S$GLB,, | Performed by: INTERNAL MEDICINE

## 2022-07-28 PROCEDURE — 4010F ACE/ARB THERAPY RXD/TAKEN: CPT | Mod: CPTII,S$GLB,, | Performed by: INTERNAL MEDICINE

## 2022-07-28 PROCEDURE — 3074F PR MOST RECENT SYSTOLIC BLOOD PRESSURE < 130 MM HG: ICD-10-PCS | Mod: CPTII,S$GLB,, | Performed by: INTERNAL MEDICINE

## 2022-07-28 PROCEDURE — 1159F PR MEDICATION LIST DOCUMENTED IN MEDICAL RECORD: ICD-10-PCS | Mod: CPTII,S$GLB,, | Performed by: INTERNAL MEDICINE

## 2022-07-28 PROCEDURE — 1159F MED LIST DOCD IN RCRD: CPT | Mod: CPTII,S$GLB,, | Performed by: INTERNAL MEDICINE

## 2022-07-28 PROCEDURE — 3079F DIAST BP 80-89 MM HG: CPT | Mod: CPTII,S$GLB,, | Performed by: INTERNAL MEDICINE

## 2022-07-28 PROCEDURE — 3074F SYST BP LT 130 MM HG: CPT | Mod: CPTII,S$GLB,, | Performed by: INTERNAL MEDICINE

## 2022-07-28 RX ORDER — AMLODIPINE BESYLATE 5 MG/1
5 TABLET ORAL DAILY
Qty: 90 TABLET | Refills: 3 | Status: SHIPPED | OUTPATIENT
Start: 2022-07-28 | End: 2023-07-18 | Stop reason: SDUPTHER

## 2022-07-28 RX ORDER — LOSARTAN POTASSIUM 25 MG/1
25 TABLET ORAL NIGHTLY
Qty: 90 TABLET | Refills: 3 | Status: SHIPPED | OUTPATIENT
Start: 2022-07-28 | End: 2023-07-18 | Stop reason: SDUPTHER

## 2022-07-28 NOTE — PROGRESS NOTES
Subjective:    Patient ID:  Shanta Sterling is a 56 y.o. female who presents for Follow-up (F/U HYPERTENSION )        HPI  DOING WELL, NO LABS, NO CHEST PAIN NO SHORTNESS OF BREATH NO TIA TYPE SYMPTOMS NO NEAR-SYNCOPE SEE ROS    Past Medical History:   Diagnosis Date    Acid reflux     Acute gastritis     Dry eyes     Dry mouth     Heart murmur     Hypertension     [facility]    Sicca syndrome, unspecified     Sjogren's disease     Valvular regurgitation      Past Surgical History:   Procedure Laterality Date    APPENDECTOMY  11/2009         BREAST SURGERY Bilateral 04/2004    Augmentation    FOOT NEUROMA SURGERY Left 1992    HYSTERECTOMY  2002     Family History   Problem Relation Age of Onset    Hypertension Mother     COPD Father     Diabetes Mellitus Father     Heart disease Father     Hypertension Father      Social History     Socioeconomic History    Marital status:    Tobacco Use    Smoking status: Never Smoker    Smokeless tobacco: Never Used   Substance and Sexual Activity    Alcohol use: Yes     Comment: one drink every deb 6 days/week    Drug use: No       Review of patient's allergies indicates:   Allergen Reactions    Codeine Other (See Comments)     vomiting       Current Outpatient Medications:     carboxymethylcellulose sodium 0.25 % Drop, 1 drop as needed., Disp: , Rfl:     cevimeline (EVOXAC) 30 mg capsule, TAKE 1 CAPSULE (30 MG TOTAL) BY MOUTH 3 (THREE) TIMES DAILY., Disp: 270 capsule, Rfl: 3    coenzyme Q10 200 mg capsule, Take 200 mg by mouth once daily., Disp: , Rfl:     estradioL (ESTRACE) 0.5 MG tablet, Take 1 tablet (0.5 mg total) by mouth once daily., Disp: 90 tablet, Rfl: 3    hydrOXYchloroQUINE (PLAQUENIL) 200 mg tablet, TAKE 1 TABLET BY MOUTH TWICE A DAY, Disp: 180 tablet, Rfl: 3    multivitamin (THERAGRAN) per tablet, Take 1 tablet by mouth once daily., Disp: , Rfl:     PROAIR RESPICLICK 90 mcg/actuation AePB, INHALE  "2 PUFFS PO 30 MINUTES BEFORE EXERCISE, Disp: , Rfl: 5    amLODIPine (NORVASC) 5 MG tablet, Take 1 tablet (5 mg total) by mouth once daily., Disp: 90 tablet, Rfl: 3    losartan (COZAAR) 25 MG tablet, Take 1 tablet (25 mg total) by mouth every evening., Disp: 90 tablet, Rfl: 3    Review of Systems   Constitutional: Negative for chills, decreased appetite, diaphoresis, fever, malaise/fatigue and night sweats.   HENT: Negative for congestion and nosebleeds.    Eyes: Negative.    Cardiovascular: Negative for chest pain, claudication, cyanosis, dyspnea on exertion, irregular heartbeat, leg swelling, near-syncope, orthopnea, palpitations, paroxysmal nocturnal dyspnea and syncope.        STABLE  RAYNAUD'S SX   Respiratory: Negative for cough, hemoptysis, shortness of breath and wheezing.    Endocrine: Negative.    Hematologic/Lymphatic: Negative for adenopathy. Does not bruise/bleed easily.   Skin: Negative for color change and rash.   Musculoskeletal: Negative for back pain and falls.   Gastrointestinal: Negative for abdominal pain, change in bowel habit, dysphagia, heartburn, hematemesis, jaundice and melena.   Genitourinary: Negative for dysuria and flank pain.   Neurological: Negative for brief paralysis, focal weakness, headaches, light-headedness, loss of balance and weakness.   Psychiatric/Behavioral: Negative for depression and memory loss.   Allergic/Immunologic: Negative for environmental allergies and persistent infections.        Objective:      Vitals:    07/28/22 1726   BP: 119/86   Pulse: 64   Weight: 65.2 kg (143 lb 11.8 oz)   Height: 5' 6" (1.676 m)   PainSc: 0-No pain     Body mass index is 23.2 kg/m².    Physical Exam  Constitutional:       Appearance: Normal appearance. She is well-developed.   HENT:      Head: Normocephalic and atraumatic.   Eyes:      General: No scleral icterus.     Extraocular Movements: Extraocular movements intact.   Neck:      Vascular: Normal carotid pulses. No carotid bruit, " hepatojugular reflux or JVD.   Cardiovascular:      Rate and Rhythm: Normal rate and regular rhythm.  No extrasystoles are present.     Pulses:           Carotid pulses are 2+ on the right side and 2+ on the left side.       Radial pulses are 2+ on the right side and 2+ on the left side.        Femoral pulses are 2+ on the right side and 2+ on the left side.       Posterior tibial pulses are 2+ on the right side and 2+ on the left side.      Heart sounds: Murmur heard.    Systolic murmur is present at the lower left sternal border and apex.   Early diastolic murmur is present at the upper right sternal border radiating to the apex.    No friction rub. No gallop.   Pulmonary:      Effort: Pulmonary effort is normal.      Breath sounds: Normal breath sounds. No rales.   Chest:      Chest wall: No tenderness.   Abdominal:      General: Bowel sounds are normal.      Palpations: Abdomen is soft.      Tenderness: There is no abdominal tenderness.   Musculoskeletal:         General: No tenderness. Normal range of motion.      Cervical back: Normal range of motion and neck supple. No edema.   Skin:     General: Skin is warm and dry.      Capillary Refill: Capillary refill takes less than 2 seconds.   Neurological:      General: No focal deficit present.      Mental Status: She is alert and oriented to person, place, and time.   Psychiatric:         Mood and Affect: Mood normal.         Speech: Speech normal.         Behavior: Behavior normal.                 ..    Chemistry        Component Value Date/Time     06/12/2019 0931    K 4.5 06/12/2019 0931     06/12/2019 0931    CO2 26 06/12/2019 0931    BUN 14 06/12/2019 0931    CREATININE 1.0 06/12/2019 0931    GLU 75 06/12/2019 0931        Component Value Date/Time    CALCIUM 9.3 06/12/2019 0931    ALKPHOS 55 10/27/2015 0921    AST 23 10/27/2015 0921    ALT 19 10/27/2015 0921    BILITOT 0.7 10/27/2015 0921    ESTGFRAFRICA >60.0 06/12/2019 0931    EGFRNONAA >60.0  06/12/2019 0931            ..No results found for: CHOL  No results found for: HDL  No results found for: LDLCALC  No results found for: TRIG  No results found for: CHOLHDL  ..  Lab Results   Component Value Date    WBC 5.97 10/27/2015    HGB 12.9 10/27/2015    HCT 38.4 10/27/2015    MCV 90 10/27/2015     10/27/2015       Test(s) Reviewed  I have reviewed the following in detail:  [] Stress test   [] Angiography   [] Echocardiogram   [] Labs   [] Other:       Assessment:         ICD-10-CM ICD-9-CM   1. Mitral and aortic regurgitation  I08.0 396.3   2. Essential hypertension  I10 401.9   3. Raynaud's phenomenon without gangrene  I73.00 443.0   4. Agatston CAC score, <100  R93.1 793.2     Problem List Items Addressed This Visit        Cardiac/Vascular    Raynaud's phenomenon    Essential hypertension    Relevant Orders    Basic Metabolic Panel    Mitral and aortic regurgitation - Primary    Agatston CAC score, <100           Plan:     LABS SOON, AVOID SUDDEN CHANGE TEMPERATURE EXPOSURE TO COLD,ALL CV CLINICALLY STABLE, NO ANGINA, NO HF, NO TIA, NO CLINICAL ARRHYTHMIA,CONTINUE CURRENT MEDS, EDUCATION, DIET, EXERCISE, RETURN TO CLINIC IN 1 YEAR      Mitral and aortic regurgitation    Essential hypertension  -     Basic Metabolic Panel; Future; Expected date: 07/28/2022    Raynaud's phenomenon without gangrene    Agatston CAC score, <100    Other orders  -     amLODIPine (NORVASC) 5 MG tablet; Take 1 tablet (5 mg total) by mouth once daily.  Dispense: 90 tablet; Refill: 3  -     losartan (COZAAR) 25 MG tablet; Take 1 tablet (25 mg total) by mouth every evening.  Dispense: 90 tablet; Refill: 3    RTC Low level/low impact aerobic exercise 5x's/wk. Heart healthy diet and risk factor modification.    See labs and med orders.    Aerobic exercise 5x's/wk. Heart healthy diet and risk factor modification.    See labs and med orders.

## 2022-12-14 NOTE — PROGRESS NOTES
"  Subjective:       Patient ID: Shanta Sterling is a 56 y.o. female with Sjogren's syndrome.    Chief Complaint: No chief complaint on file.  She returns for follow up. 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    She was last seen on 10/7/21.    She has been on HCQ since 2011. She has sicca syndrome & is on evoxac. Has + serology with +SSA+SSB+RF.  Sicca sxs stable w rx.She could not tolerate restasis. Uses OTC eye drops; lubricating gel at night; could not tolerate restasis.   Gets eye exams every 6 months for HCq & for sicca sxs.   Gets Raynaud's in the cold. Just hands. No ulcers. On amlodipine  Body aches randomly occurring, mostly anterior thighs responsive to CoQ 10 bid  R 2nd MCP chronicaly painful-- minimally swollen--at rest and w use. Feels it may be related to chronic use. No other joint pain or selling.  AM stiffness ~ 10 minutes  Denies recent dysphagia, tight skin, alopecia, pleurisy, pericarditis, photosensitivity, skin rashes.  Has had some oral ulcers in the past. May also have had reactive airway disease.     She is followed by Dr. Jefferson Rivers in Cardiology.  (Cardiology has documented heart murmurs and dx MR and AR: " High-pitched blowing holosystolic murmur is present  at the apex; High-pitched blowing decrescendo early diastolic murmur is present  at the upper right sternal border radiating to the apex" ).         Current Outpatient Medications   Medication Sig Dispense Refill    amLODIPine (NORVASC) 5 MG tablet Take 1 tablet (5 mg total) by mouth once daily. 90 tablet 3    carboxymethylcellulose sodium 0.25 % Drop 1 drop as needed.      cevimeline (EVOXAC) 30 mg capsule TAKE 1 CAPSULE (30 MG TOTAL) BY MOUTH 3 (THREE) TIMES DAILY. 270 capsule 3    coenzyme Q10 200 mg capsule Take 200 mg by mouth once daily.      estradioL (ESTRACE) 0.5 MG tablet Take 1 tablet (0.5 mg total) by mouth once daily. 90 tablet 3    " hydrOXYchloroQUINE (PLAQUENIL) 200 mg tablet TAKE 1 TABLET BY MOUTH TWICE A  tablet 3    losartan (COZAAR) 25 MG tablet Take 1 tablet (25 mg total) by mouth every evening. 90 tablet 3    multivitamin (THERAGRAN) per tablet Take 1 tablet by mouth once daily.      PROAIR RESPICLICK 90 mcg/actuation AePB INHALE 2 PUFFS PO 30 MINUTES BEFORE EXERCISE  5     No current facility-administered medications for this visit.   Not taking calcium citrate: takes calcium through the diet.   Takes almond milk for calcium source.      Allergies   Allergen Reactions    Codeine Other (See Comments)     vomiting     Past Medical History:   Diagnosis Date    Acid reflux     Acute gastritis     Dry eyes     Dry mouth     Heart murmur     Hypertension     Pneumonia 2000    [facility]    Sicca syndrome, unspecified     Sjogren's disease     Valvular regurgitation      Past Surgical History:   Procedure Laterality Date    APPENDECTOMY  11/2009         BREAST SURGERY Bilateral 04/2004    Augmentation    FOOT NEUROMA SURGERY Left 1992    HYSTERECTOMY  2002         Review of Systems   Constitutional:  Positive for diaphoresis (controlled by ERT). Negative for fatigue, fever and unexpected weight change.   HENT: Negative.  Negative for dental problem, mouth sores, sore throat, tinnitus and trouble swallowing.         Dry mouth   Eyes:  Negative for redness and visual disturbance.        Dry eyes   Respiratory: Negative.  Negative for cough, choking, chest tightness and shortness of breath.    Cardiovascular: Negative.  Negative for chest pain, palpitations and leg swelling.   Gastrointestinal: Negative.  Negative for abdominal distention, abdominal pain, blood in stool, constipation, diarrhea, nausea and vomiting.   Endocrine: Negative.    Genitourinary: Negative.  Negative for dysuria, frequency, genital sores, hematuria and menstrual problem.   Musculoskeletal:  Positive for myalgias. Negative for back pain, joint swelling,  "neck pain and neck stiffness.        Thigh myalgia controlled by CoQ10 100 mg bid   Skin: Negative.  Negative for rash.   Allergic/Immunologic: Negative.    Neurological: Negative.  Negative for dizziness, syncope, weakness, light-headedness, numbness and headaches.   Hematological: Negative.  Negative for adenopathy. Does not bruise/bleed easily.   Psychiatric/Behavioral: Negative.  Negative for dysphoric mood and sleep disturbance. The patient is not nervous/anxious.        Family History   Problem Relation Age of Onset    Hypertension Mother     COPD Father     Diabetes Mellitus Father     Heart disease Father     Hypertension Father        SH: non smoker; social drinker.  She was  at [employer] in [place].  Now doing bedside nursing in --less stress.   Exercising.     Objective:     /85   Pulse 60   Ht 5' 6" (1.676 m)   Wt 71.5 kg (157 lb 10.1 oz)   BMI 25.44 kg/m²     Physical Exam   Constitutional: She is oriented to person, place, and time. No distress.   HENT:   Head: Normocephalic and atraumatic.   Mouth/Throat: Oropharynx is clear and moist. No oropharyngeal exudate.   No facial rashes  Parotids not enlarged     Eyes: Pupils are equal, round, and reactive to light. Conjunctivae are normal. Right eye exhibits no discharge. Left eye exhibits no discharge. No scleral icterus.   Neck: No JVD present. No tracheal deviation present. No thyromegaly present.   Cardiovascular: Normal rate, regular rhythm and normal heart sounds. Exam reveals no gallop and no friction rub.   No murmur heard.  Murmurs reported by cardiologist: "High-pitched blowing holosystolic murmur is present  at the apex  High-pitched blowing decrescendo early diastolic murmur is present  at the upper right sternal border radiating to the apex"   Pulmonary/Chest: Effort normal and breath sounds normal. No respiratory distress. She has no wheezes. She has no rales. She exhibits no tenderness. "   Abdominal: Soft. Bowel sounds are normal. She exhibits no distension and no mass. There is no splenomegaly or hepatomegaly. There is no abdominal tenderness. There is no rebound and no guarding.   Musculoskeletal:         General: No tenderness. Normal range of motion.      Cervical back: Neck supple.      Comments: Cspine FROM no tenderness  Tspine FROM no tenderness  Lspine FROM no tenderness.  TMJ: unremarkable  Shoulders: FROM now on L; non tender; no synovitis; R ok;  Elbows: FROM; no synovitis; no tophi or nodules  Wrists: FROM; no synovitis;   MCPs: Minimal squaring of 1st C_MCP joints bilaterally. FROM; min SHT R 2nd MCP; no metacarpalgia;  ok; Finkelstein's neg;   PIPs:FROM; min bony swelling of 3rd PIPs; not much TTP; no synovitis;   DIPs: FROM; no synovitis; tiny Heberden's  HIPS: FROM  Knees: FROM; no synovitis; no instability;  Ankles: FROM: no synovitis   Toes: ok; no metatarsalgia.   Lymphadenopathy:     She has no cervical adenopathy.   Neurological: She is alert and oriented to person, place, and time. She has normal reflexes. No cranial nerve deficit. Gait normal.   Proximal and distal muscle strength 5/5.   Skin: Skin is warm and dry. No rash noted. She is not diaphoretic.   Psychiatric: Mood, memory, affect and judgment normal.   Vitals reviewed.        LABS:   21: BUN 20 (hi); cnne 1.09 (.95)  20: External labs: ESR 5; CRP<0.29; CBC Hg 13.1; Ht 40; WC 7k; ; CMP BUN 23; cnne 0.99 (.95); A 1:320 Sp; SSA+; SSB ok; RF ND  19: BMP ok;   18: BMP: cnne 1.0;   3/13/18: + MARIA DOLORES +SSA; rest of pro neg; UA ok;   18: TFTs ok;   18: BMP cnne 1.1 GFR 57.5; BUN 21  10/27/15: ESR 3: CRP 2.2; CBC, CMP ok;   14: ESR 2; CRP 0.7; CBC ok; alb. 3.4;   13: ESR 10; CRP 0.7; CBC ok cnne 1.1; GFR 53;   MARIA DOLORES 1:160sp; +SSA; +SSB; +RF (53);   Neg or wnl: CCP, C3, C4, Scl-70; UA; LAC; aCLA; [id number]  Vit D 56       20: EKG: OK    Imagin13: Arthritis survey personally  reviewed: unremarkable     Assessment:   Sjogren's syndrome since 7/11 on HCQ since 7/11--doing very well.   On  mg/d & evoxac 30 mg bid   +MARIA DOLORES, +SSA, +SSB, +RF    with dry eyes (intolerant to restasis),    dry mouth (on evoxac, intolerant to pilocarpine)    associated with polyarthritis (pain & swelling: hands, elbows, shoulders, knees & feet)     Feels CoQ 10 helps muscle/joint pains   Occasional oral ulcers & upper dysphagia.    Raynaud's  & AM stiffness     RP helped by amlodipine 2.5 mg/d   mother with UCTD     R 2nd MCP painful    Hx of Covid-19 infection    Body aches, headaches, fatigue, sore throat, anxiety; no fever   + rapid test 8/25    Post menopausal   S/P hysterectomy wo BSO   + vasomotor sxs-night sweats   On estradiol    Mitral and Aortic Insufficiency as per cardiology    Essential hypertension    Renal insufficiency precipitated by Aleve in past.    S/P L adhesive capsulitis with calcification head of humerus--much improved   Responsive to injection & some PT    Plan:   Continue  mg/day + evoxac 30 mg bid-tid.  Continue Eye exams q 6-12 months  Keep fluid intake high.  Labs external.  Bilateral hand imaging.  RTC 6 months;

## 2022-12-20 ENCOUNTER — OFFICE VISIT (OUTPATIENT)
Dept: RHEUMATOLOGY | Facility: CLINIC | Age: 57
End: 2022-12-20
Payer: COMMERCIAL

## 2022-12-20 VITALS
BODY MASS INDEX: 25.33 KG/M2 | HEIGHT: 66 IN | SYSTOLIC BLOOD PRESSURE: 127 MMHG | HEART RATE: 60 BPM | WEIGHT: 157.63 LBS | DIASTOLIC BLOOD PRESSURE: 85 MMHG

## 2022-12-20 DIAGNOSIS — M79.641 PAIN OF RIGHT HAND: ICD-10-CM

## 2022-12-20 DIAGNOSIS — I73.00 RAYNAUD'S PHENOMENON WITHOUT GANGRENE: ICD-10-CM

## 2022-12-20 DIAGNOSIS — M35.00 SJOGREN'S SYNDROME, WITH UNSPECIFIED ORGAN INVOLVEMENT: Primary | ICD-10-CM

## 2022-12-20 DIAGNOSIS — Z79.899 LONG-TERM USE OF HYDROXYCHLOROQUINE: ICD-10-CM

## 2022-12-20 PROCEDURE — 3074F SYST BP LT 130 MM HG: CPT | Mod: CPTII,S$GLB,, | Performed by: INTERNAL MEDICINE

## 2022-12-20 PROCEDURE — 99999 PR PBB SHADOW E&M-EST. PATIENT-LVL IV: ICD-10-PCS | Mod: PBBFAC,,, | Performed by: INTERNAL MEDICINE

## 2022-12-20 PROCEDURE — 3008F BODY MASS INDEX DOCD: CPT | Mod: CPTII,S$GLB,, | Performed by: INTERNAL MEDICINE

## 2022-12-20 PROCEDURE — 1159F MED LIST DOCD IN RCRD: CPT | Mod: CPTII,S$GLB,, | Performed by: INTERNAL MEDICINE

## 2022-12-20 PROCEDURE — 1160F RVW MEDS BY RX/DR IN RCRD: CPT | Mod: CPTII,S$GLB,, | Performed by: INTERNAL MEDICINE

## 2022-12-20 PROCEDURE — 3079F PR MOST RECENT DIASTOLIC BLOOD PRESSURE 80-89 MM HG: ICD-10-PCS | Mod: CPTII,S$GLB,, | Performed by: INTERNAL MEDICINE

## 2022-12-20 PROCEDURE — 3079F DIAST BP 80-89 MM HG: CPT | Mod: CPTII,S$GLB,, | Performed by: INTERNAL MEDICINE

## 2022-12-20 PROCEDURE — 99999 PR PBB SHADOW E&M-EST. PATIENT-LVL IV: CPT | Mod: PBBFAC,,, | Performed by: INTERNAL MEDICINE

## 2022-12-20 PROCEDURE — 99214 OFFICE O/P EST MOD 30 MIN: CPT | Mod: S$GLB,,, | Performed by: INTERNAL MEDICINE

## 2022-12-20 PROCEDURE — 99214 PR OFFICE/OUTPT VISIT, EST, LEVL IV, 30-39 MIN: ICD-10-PCS | Mod: S$GLB,,, | Performed by: INTERNAL MEDICINE

## 2022-12-20 PROCEDURE — 1159F PR MEDICATION LIST DOCUMENTED IN MEDICAL RECORD: ICD-10-PCS | Mod: CPTII,S$GLB,, | Performed by: INTERNAL MEDICINE

## 2022-12-20 PROCEDURE — 4010F PR ACE/ARB THEARPY RXD/TAKEN: ICD-10-PCS | Mod: CPTII,S$GLB,, | Performed by: INTERNAL MEDICINE

## 2022-12-20 PROCEDURE — 1160F PR REVIEW ALL MEDS BY PRESCRIBER/CLIN PHARMACIST DOCUMENTED: ICD-10-PCS | Mod: CPTII,S$GLB,, | Performed by: INTERNAL MEDICINE

## 2022-12-20 PROCEDURE — 3008F PR BODY MASS INDEX (BMI) DOCUMENTED: ICD-10-PCS | Mod: CPTII,S$GLB,, | Performed by: INTERNAL MEDICINE

## 2022-12-20 PROCEDURE — 4010F ACE/ARB THERAPY RXD/TAKEN: CPT | Mod: CPTII,S$GLB,, | Performed by: INTERNAL MEDICINE

## 2022-12-20 PROCEDURE — 3074F PR MOST RECENT SYSTOLIC BLOOD PRESSURE < 130 MM HG: ICD-10-PCS | Mod: CPTII,S$GLB,, | Performed by: INTERNAL MEDICINE

## 2022-12-20 RX ORDER — CEVIMELINE HYDROCHLORIDE 30 MG/1
30 CAPSULE ORAL 3 TIMES DAILY
Qty: 270 CAPSULE | Refills: 3 | Status: SHIPPED | OUTPATIENT
Start: 2022-12-20 | End: 2024-02-08

## 2022-12-20 RX ORDER — HYDROXYCHLOROQUINE SULFATE 200 MG/1
200 TABLET, FILM COATED ORAL 2 TIMES DAILY
Qty: 180 TABLET | Refills: 3 | Status: SHIPPED | OUTPATIENT
Start: 2022-12-20 | End: 2024-01-16

## 2022-12-20 ASSESSMENT — ROUTINE ASSESSMENT OF PATIENT INDEX DATA (RAPID3)
PSYCHOLOGICAL DISTRESS SCORE: 0
TOTAL RAPID3 SCORE: 0.17
MDHAQ FUNCTION SCORE: 0
AM STIFFNESS SCORE: 1, YES
PAIN SCORE: 0.5
WHEN YOU AWAKENED IN THE MORNING OVER THE LAST WEEK, PLEASE INDICATE THE AMOUNT OF TIME IT TAKES UNTIL YOU ARE AS LIMBER AS YOU WILL BE FOR THE DAY: 0.2
FATIGUE SCORE: 0
PATIENT GLOBAL ASSESSMENT SCORE: 0

## 2022-12-20 NOTE — PROGRESS NOTES
Rapid3 Question Responses and Scores 10/2/2021   MDHAQ Score 0   Psychologic Score 0   Pain Score 0.5   When you awakened in the morning OVER THE LAST WEEK, did you feel stiff? Yes   If Yes, please indicate the number of hours until you are as limber as you will be for the day 0.2   Fatigue Score 0   Global Health Score 0   RAPID3 Score 0.16

## 2022-12-23 ENCOUNTER — HOSPITAL ENCOUNTER (OUTPATIENT)
Dept: RADIOLOGY | Facility: HOSPITAL | Age: 57
Discharge: HOME | End: 2022-12-23
Attending: INTERNAL MEDICINE
Payer: COMMERCIAL

## 2022-12-23 DIAGNOSIS — I73.00 RAYNAUD'S PHENOMENON WITHOUT GANGRENE: ICD-10-CM

## 2022-12-23 DIAGNOSIS — M35.00 SJOGREN'S SYNDROME, WITH UNSPECIFIED ORGAN INVOLVEMENT: ICD-10-CM

## 2022-12-23 DIAGNOSIS — M79.641 PAIN OF RIGHT HAND: ICD-10-CM

## 2022-12-23 PROCEDURE — 73130 XR HAND COMPLETE 3 VIEWS BILATERAL: ICD-10-PCS | Mod: 26,50,, | Performed by: RADIOLOGY

## 2022-12-23 PROCEDURE — 73130 X-RAY EXAM OF HAND: CPT | Mod: TC,50,FY,PO

## 2022-12-23 PROCEDURE — 73130 X-RAY EXAM OF HAND: CPT | Mod: 26,50,, | Performed by: RADIOLOGY

## 2023-04-17 RX ORDER — ESTRADIOL 0.5 MG/1
TABLET ORAL
Qty: 30 TABLET | Refills: 1 | Status: SHIPPED | OUTPATIENT
Start: 2023-04-17 | End: 2023-05-02

## 2023-06-16 NOTE — PROGRESS NOTES
"  Subjective:       Patient ID: Shanta Sterling is a 57 y.o. female with Sjogren's syndrome.    Chief Complaint: No chief complaint on file.  She returns for follow up. 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    She was last seen on 12/20/22. She has Sjogren's syndrome w sicca sxs & is on evoxac and HCQ which she has been on since 2011. She sees her eye MD every 6 months.   Serology with +MARIA DOLORES +SSA+SSB+RF. SSB has subsequently been negative.  She has been on HCQ since 2011. Eyes are followed every 6 months.  Sicca sxs stable w OTCs.She could not tolerate restasis.   Still getting Raynaud's especially precipitated by air conditioning. Just hands. Amlodipine helps.  No digital ulcers.     Takes CoQ 10 bid for body aches which occur  randomly mostly anterior thighs.  No longer w joint pain (R 2nd MCP was minimally swollen & painful at rest and w use in past).  AM stiffness ~ minimal; sicca symptoms controlled.   Denies recent dysphagia, tight skin, alopecia, pleurisy, pericarditis, photosensitivity, skin rashes.  Has had some oral ulcers in the past. May also have had reactive airway disease.     She is followed by Dr. Jefferson Rivers in Cardiology.  (Cardiology has documented heart murmurs and dx MR and AR: " High-pitched blowing holosystolic murmur is present  at the apex; High-pitched blowing decrescendo early diastolic murmur is present  at the upper right sternal border radiating to the apex" ).         Current Outpatient Medications   Medication Sig Dispense Refill    amLODIPine (NORVASC) 5 MG tablet Take 1 tablet (5 mg total) by mouth once daily. 90 tablet 3    cevimeline (EVOXAC) 30 mg capsule Take 1 capsule (30 mg total) by mouth 3 (three) times daily. 270 capsule 3    coenzyme Q10 200 mg capsule Take 200 mg by mouth once daily.      estradioL (ESTRACE) 0.5 MG tablet TAKE 1 TABLET BY MOUTH EVERY DAY 90 tablet 0    " hydrOXYchloroQUINE (PLAQUENIL) 200 mg tablet Take 1 tablet (200 mg total) by mouth 2 (two) times daily. This medication requires periodic eye exams 180 tablet 3    ipratropium (ATROVENT) 42 mcg (0.06 %) nasal spray 2 sprays by Each Nostril route Daily.      losartan (COZAAR) 25 MG tablet Take 1 tablet (25 mg total) by mouth every evening. 90 tablet 3    multivitamin (THERAGRAN) per tablet Take 1 tablet by mouth once daily.      PROAIR RESPICLICK 90 mcg/actuation AePB INHALE 2 PUFFS PO 30 MINUTES BEFORE EXERCISE  5     No current facility-administered medications for this visit.       Not taking calcium citrate: takes calcium through the diet.   Takes almond milk for calcium source.      Allergies   Allergen Reactions    Codeine Other (See Comments)     vomiting     Past Medical History:   Diagnosis Date    Acid reflux     Acute gastritis     Dry eyes     Dry mouth     Heart murmur     Hypertension     Pneumonia 2000    [facility]    Sicca syndrome, unspecified     Sjogren's disease     Valvular regurgitation      Past Surgical History:   Procedure Laterality Date    APPENDECTOMY  11/2009         BREAST SURGERY Bilateral 04/2004    Augmentation    FOOT NEUROMA SURGERY Left 1992    HYSTERECTOMY  2002         Review of Systems   Constitutional:  Positive for diaphoresis (controlled by ERT). Negative for fatigue, fever and unexpected weight change.   HENT: Negative.  Negative for dental problem, mouth sores, sore throat, tinnitus and trouble swallowing.         Dry mouth   Eyes:  Negative for redness and visual disturbance.        Dry eyes   Respiratory: Negative.  Negative for cough, choking, chest tightness and shortness of breath.    Cardiovascular: Negative.  Negative for chest pain, palpitations and leg swelling.   Gastrointestinal: Negative.  Negative for abdominal distention, abdominal pain, blood in stool, constipation, diarrhea, nausea and vomiting.   Endocrine: Negative.    Genitourinary: Negative.   "Negative for dysuria, frequency, genital sores, hematuria and menstrual problem.   Musculoskeletal:  Negative for back pain, joint swelling, myalgias, neck pain and neck stiffness.        Thigh myalgia controlled by CoQ10 100 mg bid   Skin: Negative.  Negative for rash.   Allergic/Immunologic: Negative.    Neurological: Negative.  Negative for dizziness, syncope, weakness, light-headedness, numbness and headaches.   Hematological: Negative.  Negative for adenopathy. Does not bruise/bleed easily.   Psychiatric/Behavioral: Negative.  Negative for dysphoric mood and sleep disturbance. The patient is not nervous/anxious.        Family History   Problem Relation Age of Onset    Hypertension Mother     COPD Father     Diabetes Mellitus Father     Heart disease Father     Hypertension Father        SH: non smoker; social drinker.  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     Objective:     /89   Pulse 62   Ht 5' 6" (1.676 m)   Wt 68 kg (149 lb 14.6 oz)   BMI 24.20 kg/m²     Physical Exam   Constitutional: She is oriented to person, place, and time. No distress.   HENT:   Head: Normocephalic and atraumatic.   Mouth/Throat: Oropharynx is clear and moist. No oropharyngeal exudate.   No facial rashes  Parotids not enlarged     Eyes: Pupils are equal, round, and reactive to light. Conjunctivae are normal. Right eye exhibits no discharge. Left eye exhibits no discharge. No scleral icterus.   Neck: No JVD present. No tracheal deviation present. No thyromegaly present.   Cardiovascular: Normal rate, regular rhythm and normal heart sounds. Exam reveals no gallop and no friction rub.   No murmur heard.  Murmurs reported by cardiologist: "High-pitched blowing holosystolic murmur is present  at the apex  High-pitched blowing decrescendo early diastolic murmur is present  at the upper right sternal border radiating to the apex" "   Pulmonary/Chest: Effort normal and breath sounds normal. No respiratory distress. She has no wheezes. She has no rales. She exhibits no tenderness.   Abdominal: Soft. Bowel sounds are normal. She exhibits no distension and no mass. There is no splenomegaly or hepatomegaly. There is no abdominal tenderness. There is no rebound and no guarding.   Musculoskeletal:         General: No tenderness. Normal range of motion.      Cervical back: Neck supple.      Comments: Cspine FROM no tenderness  Tspine FROM no tenderness  Lspine FROM no tenderness.  TMJ: unremarkable  Shoulders: FROM now on L; non tender; no synovitis; R ok;  Elbows: FROM; no synovitis; no tophi or nodules  Wrists: FROM; no synovitis;   MCPs: Minimal squaring of 1st C_MCP joints bilaterally. FROM; no metacarpalgia;  ok;  PIPs: FROM; min bony swelling of 3rd PIPs; no TTP; no synovitis;   DIPs: FROM; no synovitis; tiny Heberden's  HIPS: FROM  Knees: FROM; no synovitis; no instability;  Ankles: FROM: no synovitis   Toes: ok; no metatarsalgia.   Lymphadenopathy:     She has no cervical adenopathy.   Neurological: She is alert and oriented to person, place, and time. She has normal reflexes. No cranial nerve deficit. Gait normal.   Proximal and distal muscle strength 5/5.   Skin: Skin is warm and dry. No rash noted. She is not diaphoretic.   Psychiatric: Her behavior is normal. Mood, memory, affect and judgment normal.   Vitals reviewed.      LABS:   [facility] CRP <.29; CBC ok; cnne 0.93 (ok) GFR 71; RF 27 (14); CCP neg  7/22/21: BUN 20 (hi); cnne 1.09 (.95)  12/30/20: External labs: ESR 5; CRP<0.29; CBC Hg 13.1; Ht 40; WC 7k; ; CMP BUN 23; cnne 0.99 (.95); A 1:320 Sp; SSA+; SSB ok; RF ND  6/12/19: BMP ok;   9/7/18: BMP: cnne 1.0;   3/13/18: + MARIA DOLORES +SSA; rest of pro neg; UA ok;   1/12/18: TFTs ok;   1/11/18: BMP cnne 1.1 GFR 57.5; BUN 21  10/27/15: ESR 3: CRP 2.2; CBC, CMP ok;   11/20/14: ESR 2; CRP 0.7; CBC ok; alb. 3.4;    13: ESR 10; CRP 0.7; CBC ok cnne 1.1; GFR 53;   MARIA DOLORES 1:160sp; +SSA; +SSB; +RF (53);   Neg or wnl: CCP, C3, C4, Scl-70; UA; LAC; aCLA; [id number]  Vit D 56       20: EKG: OK    Imagin22: Bilateral hands: personally viewed: unremarkable.  13: Arthritis survey personally reviewed: unremarkable     Assessment:   Sjogren's syndrome since  on HCQ since --doing very well.   On  mg/d & evoxac 30 mg bid   +MARIA DOLORES, +SSA, +SSB, +RF    with dry eyes (intolerant to restasis),    dry mouth (on evoxac, intolerant to pilocarpine)    associated with polyarthritis (pain & swelling: hands, elbows, shoulders, knees & feet)     Feels CoQ 10 helps muscle/joint pains   Occasional oral ulcers & upper dysphagia.    Raynaud's  & AM stiffness in past    RP helped by amlodipine 2.5 mg/d   mother with UCTD     R 2nd MCP painful    Hx of Covid-19 infection    Body aches, headaches, fatigue, sore throat, anxiety; no fever   + rapid test     Post menopausal   S/P hysterectomy wo BSO   + vasomotor sxs-night sweats   On estradiol    Mitral and Aortic Insufficiency as per cardiology    Essential hypertension    Renal insufficiency precipitated by Aleve in past.    S/P L adhesive capsulitis with calcification head of humerus--much improved   Responsive to injection & some PT    Plan:   Continue  mg/day + evoxac 30 mg bid-tid.  Continue Eye exams q 6-12 months  Keep fluid intake high.  Keep exercising.   RTC 6 months VV ok; ;

## 2023-06-21 ENCOUNTER — OFFICE VISIT (OUTPATIENT)
Dept: RHEUMATOLOGY | Facility: CLINIC | Age: 58
End: 2023-06-21
Payer: COMMERCIAL

## 2023-06-21 VITALS
HEIGHT: 66 IN | BODY MASS INDEX: 24.1 KG/M2 | WEIGHT: 149.94 LBS | SYSTOLIC BLOOD PRESSURE: 120 MMHG | HEART RATE: 62 BPM | DIASTOLIC BLOOD PRESSURE: 89 MMHG

## 2023-06-21 DIAGNOSIS — N18.2 CHRONIC KIDNEY DISEASE, STAGE 2, MILDLY DECREASED GFR: ICD-10-CM

## 2023-06-21 DIAGNOSIS — M35.00 SJOGREN'S SYNDROME, WITH UNSPECIFIED ORGAN INVOLVEMENT: Primary | ICD-10-CM

## 2023-06-21 DIAGNOSIS — I73.00 RAYNAUD'S PHENOMENON WITHOUT GANGRENE: ICD-10-CM

## 2023-06-21 DIAGNOSIS — Z79.899 LONG-TERM USE OF HYDROXYCHLOROQUINE: ICD-10-CM

## 2023-06-21 PROCEDURE — 3074F SYST BP LT 130 MM HG: CPT | Mod: CPTII,S$GLB,, | Performed by: INTERNAL MEDICINE

## 2023-06-21 PROCEDURE — 99999 PR PBB SHADOW E&M-EST. PATIENT-LVL IV: ICD-10-PCS | Mod: PBBFAC,,, | Performed by: INTERNAL MEDICINE

## 2023-06-21 PROCEDURE — 99214 OFFICE O/P EST MOD 30 MIN: CPT | Mod: S$GLB,,, | Performed by: INTERNAL MEDICINE

## 2023-06-21 PROCEDURE — 3079F DIAST BP 80-89 MM HG: CPT | Mod: CPTII,S$GLB,, | Performed by: INTERNAL MEDICINE

## 2023-06-21 PROCEDURE — 3079F PR MOST RECENT DIASTOLIC BLOOD PRESSURE 80-89 MM HG: ICD-10-PCS | Mod: CPTII,S$GLB,, | Performed by: INTERNAL MEDICINE

## 2023-06-21 PROCEDURE — 3074F PR MOST RECENT SYSTOLIC BLOOD PRESSURE < 130 MM HG: ICD-10-PCS | Mod: CPTII,S$GLB,, | Performed by: INTERNAL MEDICINE

## 2023-06-21 PROCEDURE — 4010F ACE/ARB THERAPY RXD/TAKEN: CPT | Mod: CPTII,S$GLB,, | Performed by: INTERNAL MEDICINE

## 2023-06-21 PROCEDURE — 1160F RVW MEDS BY RX/DR IN RCRD: CPT | Mod: CPTII,S$GLB,, | Performed by: INTERNAL MEDICINE

## 2023-06-21 PROCEDURE — 3008F PR BODY MASS INDEX (BMI) DOCUMENTED: ICD-10-PCS | Mod: CPTII,S$GLB,, | Performed by: INTERNAL MEDICINE

## 2023-06-21 PROCEDURE — 1159F MED LIST DOCD IN RCRD: CPT | Mod: CPTII,S$GLB,, | Performed by: INTERNAL MEDICINE

## 2023-06-21 PROCEDURE — 4010F PR ACE/ARB THEARPY RXD/TAKEN: ICD-10-PCS | Mod: CPTII,S$GLB,, | Performed by: INTERNAL MEDICINE

## 2023-06-21 PROCEDURE — 99999 PR PBB SHADOW E&M-EST. PATIENT-LVL IV: CPT | Mod: PBBFAC,,, | Performed by: INTERNAL MEDICINE

## 2023-06-21 PROCEDURE — 1159F PR MEDICATION LIST DOCUMENTED IN MEDICAL RECORD: ICD-10-PCS | Mod: CPTII,S$GLB,, | Performed by: INTERNAL MEDICINE

## 2023-06-21 PROCEDURE — 1160F PR REVIEW ALL MEDS BY PRESCRIBER/CLIN PHARMACIST DOCUMENTED: ICD-10-PCS | Mod: CPTII,S$GLB,, | Performed by: INTERNAL MEDICINE

## 2023-06-21 PROCEDURE — 3008F BODY MASS INDEX DOCD: CPT | Mod: CPTII,S$GLB,, | Performed by: INTERNAL MEDICINE

## 2023-06-21 PROCEDURE — 99214 PR OFFICE/OUTPT VISIT, EST, LEVL IV, 30-39 MIN: ICD-10-PCS | Mod: S$GLB,,, | Performed by: INTERNAL MEDICINE

## 2023-06-21 RX ORDER — IPRATROPIUM BROMIDE 42 UG/1
2 SPRAY, METERED NASAL DAILY
COMMUNITY

## 2023-06-21 ASSESSMENT — ROUTINE ASSESSMENT OF PATIENT INDEX DATA (RAPID3)
WHEN YOU AWAKENED IN THE MORNING OVER THE LAST WEEK, PLEASE INDICATE THE AMOUNT OF TIME IT TAKES UNTIL YOU ARE AS LIMBER AS YOU WILL BE FOR THE DAY: 0.2
PAIN SCORE: 0.5
PATIENT GLOBAL ASSESSMENT SCORE: 0
PSYCHOLOGICAL DISTRESS SCORE: 0
FATIGUE SCORE: 0
TOTAL RAPID3 SCORE: 0.17
MDHAQ FUNCTION SCORE: 0
AM STIFFNESS SCORE: 1, YES

## 2023-06-21 NOTE — PATIENT INSTRUCTIONS
Check with your Opthalmologist to see if Optical Coherence Tomography has been done.    Keep exercising daily.

## 2023-07-11 ENCOUNTER — PATIENT MESSAGE (OUTPATIENT)
Dept: CARDIOLOGY | Facility: CLINIC | Age: 58
End: 2023-07-11
Payer: COMMERCIAL

## 2023-07-12 DIAGNOSIS — Z13.220 LIPID SCREENING: Primary | ICD-10-CM

## 2023-07-13 ENCOUNTER — PATIENT MESSAGE (OUTPATIENT)
Dept: CARDIOLOGY | Facility: CLINIC | Age: 58
End: 2023-07-13
Payer: COMMERCIAL

## 2023-07-18 ENCOUNTER — OFFICE VISIT (OUTPATIENT)
Dept: CARDIOLOGY | Facility: CLINIC | Age: 58
End: 2023-07-18
Payer: COMMERCIAL

## 2023-07-18 VITALS
DIASTOLIC BLOOD PRESSURE: 71 MMHG | WEIGHT: 143.75 LBS | SYSTOLIC BLOOD PRESSURE: 111 MMHG | HEIGHT: 66 IN | BODY MASS INDEX: 23.1 KG/M2 | HEART RATE: 64 BPM

## 2023-07-18 DIAGNOSIS — I10 ESSENTIAL HYPERTENSION: Primary | Chronic | ICD-10-CM

## 2023-07-18 DIAGNOSIS — R93.1 AGATSTON CAC SCORE, <100: Chronic | ICD-10-CM

## 2023-07-18 DIAGNOSIS — I08.0 MITRAL AND AORTIC REGURGITATION: Chronic | ICD-10-CM

## 2023-07-18 DIAGNOSIS — I73.01 RAYNAUD'S PHENOMENON WITH GANGRENE: Chronic | ICD-10-CM

## 2023-07-18 PROCEDURE — 99999 PR PBB SHADOW E&M-EST. PATIENT-LVL III: ICD-10-PCS | Mod: PBBFAC,,, | Performed by: INTERNAL MEDICINE

## 2023-07-18 PROCEDURE — 99214 OFFICE O/P EST MOD 30 MIN: CPT | Mod: S$GLB,,, | Performed by: INTERNAL MEDICINE

## 2023-07-18 PROCEDURE — 3074F PR MOST RECENT SYSTOLIC BLOOD PRESSURE < 130 MM HG: ICD-10-PCS | Mod: CPTII,S$GLB,, | Performed by: INTERNAL MEDICINE

## 2023-07-18 PROCEDURE — 3074F SYST BP LT 130 MM HG: CPT | Mod: CPTII,S$GLB,, | Performed by: INTERNAL MEDICINE

## 2023-07-18 PROCEDURE — 4010F ACE/ARB THERAPY RXD/TAKEN: CPT | Mod: CPTII,S$GLB,, | Performed by: INTERNAL MEDICINE

## 2023-07-18 PROCEDURE — 3008F PR BODY MASS INDEX (BMI) DOCUMENTED: ICD-10-PCS | Mod: CPTII,S$GLB,, | Performed by: INTERNAL MEDICINE

## 2023-07-18 PROCEDURE — 3078F PR MOST RECENT DIASTOLIC BLOOD PRESSURE < 80 MM HG: ICD-10-PCS | Mod: CPTII,S$GLB,, | Performed by: INTERNAL MEDICINE

## 2023-07-18 PROCEDURE — 1159F PR MEDICATION LIST DOCUMENTED IN MEDICAL RECORD: ICD-10-PCS | Mod: CPTII,S$GLB,, | Performed by: INTERNAL MEDICINE

## 2023-07-18 PROCEDURE — 99999 PR PBB SHADOW E&M-EST. PATIENT-LVL III: CPT | Mod: PBBFAC,,, | Performed by: INTERNAL MEDICINE

## 2023-07-18 PROCEDURE — 1159F MED LIST DOCD IN RCRD: CPT | Mod: CPTII,S$GLB,, | Performed by: INTERNAL MEDICINE

## 2023-07-18 PROCEDURE — 4010F PR ACE/ARB THEARPY RXD/TAKEN: ICD-10-PCS | Mod: CPTII,S$GLB,, | Performed by: INTERNAL MEDICINE

## 2023-07-18 PROCEDURE — 99214 PR OFFICE/OUTPT VISIT, EST, LEVL IV, 30-39 MIN: ICD-10-PCS | Mod: S$GLB,,, | Performed by: INTERNAL MEDICINE

## 2023-07-18 PROCEDURE — 3008F BODY MASS INDEX DOCD: CPT | Mod: CPTII,S$GLB,, | Performed by: INTERNAL MEDICINE

## 2023-07-18 PROCEDURE — 3078F DIAST BP <80 MM HG: CPT | Mod: CPTII,S$GLB,, | Performed by: INTERNAL MEDICINE

## 2023-07-18 RX ORDER — AMLODIPINE BESYLATE 5 MG/1
5 TABLET ORAL DAILY
Qty: 90 TABLET | Refills: 3 | Status: SHIPPED | OUTPATIENT
Start: 2023-07-18

## 2023-07-18 RX ORDER — LOSARTAN POTASSIUM 25 MG/1
25 TABLET ORAL NIGHTLY
Qty: 90 TABLET | Refills: 3 | Status: SHIPPED | OUTPATIENT
Start: 2023-07-18

## 2023-07-18 NOTE — PROGRESS NOTES
Subjective:    Patient ID:  Shanta Sterling is a 57 y.o. female who presents for Hyperlipidemia and Hypertension        HPI    OUTSIDE LABS, , HDL 79, , DOING WELL, DIET, LESS ETOH, GFR 71 IN January, NO CHEST PAIN NO SHORTNESS OF BREATH NO TIA TYPE SYMPTOMS NO NEAR-SYNCOPE, SEE ROS  Past Medical History:   Diagnosis Date    Acid reflux     Acute gastritis     Dry eyes     Dry mouth     Heart murmur     Hypertension     Pneumonia 2000    [facility]    Sicca syndrome, unspecified     Sjogren's disease     Valvular regurgitation      Past Surgical History:   Procedure Laterality Date    APPENDECTOMY  11/2009         BREAST SURGERY Bilateral 04/2004    Augmentation    FOOT NEUROMA SURGERY Left 1992    HYSTERECTOMY  2002     Family History   Problem Relation Age of Onset    Hypertension Mother     COPD Father     Diabetes Mellitus Father     Heart disease Father     Hypertension Father      Social History     Socioeconomic History    Marital status:    Tobacco Use    Smoking status: Never    Smokeless tobacco: Never   Substance and Sexual Activity    Alcohol use: Yes     Comment: one drink every deb 6 days/week    Drug use: No       Review of patient's allergies indicates:   Allergen Reactions    Codeine Other (See Comments)     vomiting       Current Outpatient Medications:     cevimeline (EVOXAC) 30 mg capsule, Take 1 capsule (30 mg total) by mouth 3 (three) times daily., Disp: 270 capsule, Rfl: 3    coenzyme Q10 200 mg capsule, Take 200 mg by mouth once daily., Disp: , Rfl:     estradioL (ESTRACE) 0.5 MG tablet, TAKE 1 TABLET BY MOUTH EVERY DAY, Disp: 90 tablet, Rfl: 0    hydrOXYchloroQUINE (PLAQUENIL) 200 mg tablet, Take 1 tablet (200 mg total) by mouth 2 (two) times daily. This medication requires periodic eye exams, Disp: 180 tablet, Rfl: 3    ipratropium (ATROVENT) 42 mcg (0.06 %) nasal spray, 2 sprays by Each Nostril route Daily., Disp: , Rfl:     multivitamin (THERAGRAN) per  "tablet, Take 1 tablet by mouth once daily., Disp: , Rfl:     PROAIR RESPICLICK 90 mcg/actuation AePB, INHALE 2 PUFFS PO 30 MINUTES BEFORE EXERCISE, Disp: , Rfl: 5    amLODIPine (NORVASC) 5 MG tablet, Take 1 tablet (5 mg total) by mouth once daily., Disp: 90 tablet, Rfl: 3    losartan (COZAAR) 25 MG tablet, Take 1 tablet (25 mg total) by mouth every evening., Disp: 90 tablet, Rfl: 3    Review of Systems   Constitutional: Negative for chills, decreased appetite, diaphoresis, fever, malaise/fatigue and night sweats.   HENT:  Negative for congestion and nosebleeds.    Eyes:  Negative for blurred vision and visual disturbance.   Cardiovascular:  Negative for chest pain, claudication, cyanosis, dyspnea on exertion, irregular heartbeat, leg swelling, near-syncope, orthopnea, palpitations, paroxysmal nocturnal dyspnea and syncope.        STABLE  RAYNAUD'S SX   Respiratory:  Negative for cough, hemoptysis, shortness of breath and wheezing.    Endocrine: Negative for polyphagia and polyuria.   Hematologic/Lymphatic: Negative for adenopathy. Does not bruise/bleed easily.   Skin:  Negative for color change and rash.   Musculoskeletal:  Negative for back pain and falls.   Gastrointestinal:  Negative for abdominal pain, change in bowel habit, dysphagia, heartburn, hematemesis, jaundice and melena.   Genitourinary:  Negative for dysuria and flank pain.   Neurological:  Negative for brief paralysis, focal weakness, headaches, light-headedness, loss of balance, paresthesias and weakness.   Psychiatric/Behavioral:  Negative for depression.    Allergic/Immunologic: Negative for persistent infections.      Objective:      Vitals:    07/18/23 1500   BP: 111/71   Pulse: 64   Weight: 65.2 kg (143 lb 11.8 oz)   Height: 5' 6" (1.676 m)   PainSc: 0-No pain     Body mass index is 23.2 kg/m².    Physical Exam  Constitutional:       Appearance: Normal appearance. She is well-developed.   HENT:      Head: Normocephalic and atraumatic.   Eyes:    "   Extraocular Movements: Extraocular movements intact.      Conjunctiva/sclera: Conjunctivae normal.   Neck:      Vascular: Normal carotid pulses. No carotid bruit, hepatojugular reflux or JVD.   Cardiovascular:      Rate and Rhythm: Normal rate and regular rhythm. No extrasystoles are present.     Pulses:           Carotid pulses are 2+ on the right side and 2+ on the left side.       Radial pulses are 2+ on the right side and 2+ on the left side.        Posterior tibial pulses are 2+ on the right side and 2+ on the left side.      Heart sounds: Murmur heard.   Systolic murmur is present at the lower left sternal border.   Diastolic murmur is present with a grade of 1/4 at the upper right sternal border.     No friction rub. No gallop.   Pulmonary:      Effort: Pulmonary effort is normal.      Breath sounds: Normal breath sounds. No rales.   Abdominal:      General: Bowel sounds are normal.      Palpations: Abdomen is soft.      Tenderness: There is no abdominal tenderness.   Musculoskeletal:         General: No tenderness. Normal range of motion.      Cervical back: Neck supple. No edema.   Skin:     General: Skin is warm and dry.      Capillary Refill: Capillary refill takes less than 2 seconds.   Neurological:      General: No focal deficit present.      Mental Status: She is alert and oriented to person, place, and time.   Psychiatric:         Mood and Affect: Mood normal.         Speech: Speech normal.         Behavior: Behavior normal.               ..    Chemistry        Component Value Date/Time     06/12/2019 0931    K 4.5 06/12/2019 0931     06/12/2019 0931    CO2 26 06/12/2019 0931    BUN 14 06/12/2019 0931    CREATININE 1.0 06/12/2019 0931    GLU 75 06/12/2019 0931        Component Value Date/Time    CALCIUM 9.3 06/12/2019 0931    ALKPHOS 55 10/27/2015 0921    AST 23 10/27/2015 0921    ALT 19 10/27/2015 0921    BILITOT 0.7 10/27/2015 0921    ESTGFRAFRICA >60.0 06/12/2019 0931    EGFRNONAA  >60.0 06/12/2019 0931            ..No results found for: CHOL  No results found for: HDL  No results found for: LDLCALC  No results found for: TRIG  No results found for: CHOLHDL  ..  Lab Results   Component Value Date    WBC 5.97 10/27/2015    HGB 12.9 10/27/2015    HCT 38.4 10/27/2015    MCV 90 10/27/2015     10/27/2015       Test(s) Reviewed  I have reviewed the following in detail:  [] Stress test   [] Angiography   [] Echocardiogram   [x] Labs   [] Other:       Assessment:         ICD-10-CM ICD-9-CM   1. Essential hypertension  I10 401.9   2. Raynaud's phenomenon with gangrene  I73.01 443.0     785.4   3. Mitral and aortic regurgitation  I08.0 396.3   4. Agatston CAC score, <100  R93.1 793.2     Problem List Items Addressed This Visit          Cardiac/Vascular    Raynaud's phenomenon    Essential hypertension - Primary    Mitral and aortic regurgitation    Agatston CAC score, <100        Plan:     ALL CV CLINICALLY STABLE, NO ANGINA, NO HF, NO TIA, NO CLINICAL ARRHYTHMIA,CONTINUE CURRENT MEDS, EDUCATION, DIET, EXERCISE , AVOID SUDDEN EXPOSURE TO COLD OR CHANGE IN TEMPERATURE, STAY WELL HYDRATED RETURN TO CLINIC IN 1 YEAR      Essential hypertension  Comments:  CONTROLLED    Raynaud's phenomenon with gangrene    Mitral and aortic regurgitation    Agatston CAC score, <100    Other orders  -     losartan (COZAAR) 25 MG tablet; Take 1 tablet (25 mg total) by mouth every evening.  Dispense: 90 tablet; Refill: 3  -     amLODIPine (NORVASC) 5 MG tablet; Take 1 tablet (5 mg total) by mouth once daily.  Dispense: 90 tablet; Refill: 3    RTC Low level/low impact aerobic exercise 5x's/wk. Heart healthy diet and risk factor modification.    See labs and med orders.    Aerobic exercise 5x's/wk. Heart healthy diet and risk factor modification.    See labs and med orders.

## 2024-01-15 DIAGNOSIS — M35.00 SJOGREN'S SYNDROME, WITH UNSPECIFIED ORGAN INVOLVEMENT: ICD-10-CM

## 2024-01-16 RX ORDER — HYDROXYCHLOROQUINE SULFATE 200 MG/1
TABLET, FILM COATED ORAL
Qty: 180 TABLET | Refills: 3 | Status: SHIPPED | OUTPATIENT
Start: 2024-01-16

## 2024-01-31 ENCOUNTER — PATIENT MESSAGE (OUTPATIENT)
Dept: RHEUMATOLOGY | Facility: CLINIC | Age: 59
End: 2024-01-31
Payer: COMMERCIAL

## 2024-02-08 DIAGNOSIS — M35.00 SJOGREN'S SYNDROME, WITH UNSPECIFIED ORGAN INVOLVEMENT: ICD-10-CM

## 2024-02-08 RX ORDER — CEVIMELINE HYDROCHLORIDE 30 MG/1
CAPSULE ORAL
Qty: 270 CAPSULE | Refills: 3 | Status: SHIPPED | OUTPATIENT
Start: 2024-02-08

## 2024-03-08 PROBLEM — N18.2 CHRONIC KIDNEY DISEASE, STAGE 2, MILDLY DECREASED GFR: Status: RESOLVED | Noted: 2021-05-31 | Resolved: 2024-03-08

## 2024-03-08 PROBLEM — Z00.00 WELLNESS EXAMINATION: Status: ACTIVE | Noted: 2024-03-08

## 2024-03-08 PROBLEM — K21.9 GASTROESOPHAGEAL REFLUX DISEASE WITHOUT ESOPHAGITIS: Status: ACTIVE | Noted: 2024-03-08

## 2024-03-08 PROBLEM — N18.31 STAGE 3A CHRONIC KIDNEY DISEASE: Status: ACTIVE | Noted: 2024-03-08

## 2024-03-08 PROBLEM — E78.2 MIXED HYPERLIPIDEMIA: Status: ACTIVE | Noted: 2024-03-08

## 2024-05-07 PROBLEM — N12 PYELONEPHRITIS: Status: ACTIVE | Noted: 2024-05-07

## 2024-06-10 PROBLEM — Z00.00 WELLNESS EXAMINATION: Status: RESOLVED | Noted: 2024-03-08 | Resolved: 2024-06-10
